# Patient Record
Sex: MALE | Race: WHITE | Employment: OTHER | URBAN - METROPOLITAN AREA
[De-identification: names, ages, dates, MRNs, and addresses within clinical notes are randomized per-mention and may not be internally consistent; named-entity substitution may affect disease eponyms.]

---

## 2024-02-15 LAB
INR PPP: 2.9
PROTHROMBIN TIME: 31.7 SEC (ref 9.3–12.4)

## 2024-02-19 LAB
ALBUMIN SERPL-MCNC: 3.7 G/DL (ref 3.5–5.2)
ALP SERPL-CCNC: 113 U/L (ref 40–129)
ALT SERPL-CCNC: 22 U/L (ref 0–40)
ANION GAP SERPL CALCULATED.3IONS-SCNC: 14 MMOL/L (ref 7–16)
AST SERPL-CCNC: 42 U/L (ref 0–39)
BASOPHILS # BLD: 0.02 K/UL (ref 0–0.2)
BASOPHILS NFR BLD: 0 % (ref 0–2)
BILIRUB SERPL-MCNC: 0.8 MG/DL (ref 0–1.2)
BUN SERPL-MCNC: 55 MG/DL (ref 6–23)
CALCIUM SERPL-MCNC: 8.9 MG/DL (ref 8.6–10.2)
CHLORIDE SERPL-SCNC: 100 MMOL/L (ref 98–107)
CO2 SERPL-SCNC: 29 MMOL/L (ref 22–29)
CREAT SERPL-MCNC: 1.8 MG/DL (ref 0.7–1.2)
EOSINOPHIL # BLD: 0.03 K/UL (ref 0.05–0.5)
EOSINOPHILS RELATIVE PERCENT: 1 % (ref 0–6)
ERYTHROCYTE [DISTWIDTH] IN BLOOD BY AUTOMATED COUNT: 14.2 % (ref 11.5–15)
GFR SERPL CREATININE-BSD FRML MDRD: 36 ML/MIN/1.73M2
GLUCOSE SERPL-MCNC: 116 MG/DL (ref 74–99)
HBA1C MFR BLD: 6.4 % (ref 4–5.6)
HCT VFR BLD AUTO: 36.5 % (ref 37–54)
HGB BLD-MCNC: 11.7 G/DL (ref 12.5–16.5)
IMM GRANULOCYTES # BLD AUTO: <0.03 K/UL (ref 0–0.58)
IMM GRANULOCYTES NFR BLD: 0 % (ref 0–5)
INR PPP: 2.2
LYMPHOCYTES NFR BLD: 0.97 K/UL (ref 1.5–4)
LYMPHOCYTES RELATIVE PERCENT: 16 % (ref 20–42)
MCH RBC QN AUTO: 31 PG (ref 26–35)
MCHC RBC AUTO-ENTMCNC: 32.1 G/DL (ref 32–34.5)
MCV RBC AUTO: 96.8 FL (ref 80–99.9)
MONOCYTES NFR BLD: 0.76 K/UL (ref 0.1–0.95)
MONOCYTES NFR BLD: 13 % (ref 2–12)
NEUTROPHILS NFR BLD: 70 % (ref 43–80)
NEUTS SEG NFR BLD: 4.13 K/UL (ref 1.8–7.3)
PLATELET # BLD AUTO: 151 K/UL (ref 130–450)
PMV BLD AUTO: 11.8 FL (ref 7–12)
POTASSIUM SERPL-SCNC: 4.2 MMOL/L (ref 3.5–5)
PROT SERPL-MCNC: 7.1 G/DL (ref 6.4–8.3)
PROTHROMBIN TIME: 23.6 SEC (ref 9.3–12.4)
RBC # BLD AUTO: 3.77 M/UL (ref 3.8–5.8)
SODIUM SERPL-SCNC: 143 MMOL/L (ref 132–146)
WBC OTHER # BLD: 5.9 K/UL (ref 4.5–11.5)

## 2024-02-26 LAB
HCT VFR BLD AUTO: 34.1 % (ref 37–54)
HGB BLD-MCNC: 10.5 G/DL (ref 12.5–16.5)
INR PPP: 3
PROTHROMBIN TIME: 32.9 SEC (ref 9.3–12.4)

## 2024-02-27 LAB
INR PPP: 3
PROTHROMBIN TIME: 32.9 SEC (ref 9.3–12.4)

## 2024-03-01 LAB
HCT VFR BLD AUTO: 33.3 % (ref 37–54)
HGB BLD-MCNC: 10.4 G/DL (ref 12.5–16.5)
INR PPP: 3.1
PROTHROMBIN TIME: 33.1 SEC (ref 9.3–12.4)

## 2024-03-15 LAB
ANION GAP SERPL CALCULATED.3IONS-SCNC: 15 MMOL/L (ref 7–16)
BUN SERPL-MCNC: 55 MG/DL (ref 6–23)
CALCIUM SERPL-MCNC: 8.8 MG/DL (ref 8.6–10.2)
CHLORIDE SERPL-SCNC: 96 MMOL/L (ref 98–107)
CO2 SERPL-SCNC: 26 MMOL/L (ref 22–29)
CREAT SERPL-MCNC: 2.1 MG/DL (ref 0.7–1.2)
GFR SERPL CREATININE-BSD FRML MDRD: 30 ML/MIN/1.73M2
GLUCOSE SERPL-MCNC: 109 MG/DL (ref 74–99)
POTASSIUM SERPL-SCNC: 4 MMOL/L (ref 3.5–5)
SODIUM SERPL-SCNC: 137 MMOL/L (ref 132–146)

## 2024-03-22 LAB
ANION GAP SERPL CALCULATED.3IONS-SCNC: 11 MMOL/L (ref 7–16)
BUN SERPL-MCNC: 48 MG/DL (ref 6–23)
CALCIUM SERPL-MCNC: 8.7 MG/DL (ref 8.6–10.2)
CHLORIDE SERPL-SCNC: 97 MMOL/L (ref 98–107)
CO2 SERPL-SCNC: 30 MMOL/L (ref 22–29)
CREAT SERPL-MCNC: 1.9 MG/DL (ref 0.7–1.2)
GFR SERPL CREATININE-BSD FRML MDRD: 32 ML/MIN/1.73M2
GLUCOSE SERPL-MCNC: 131 MG/DL (ref 74–99)
POTASSIUM SERPL-SCNC: 3.7 MMOL/L (ref 3.5–5)
SODIUM SERPL-SCNC: 138 MMOL/L (ref 132–146)

## 2024-03-29 LAB
ANION GAP SERPL CALCULATED.3IONS-SCNC: 15 MMOL/L (ref 7–16)
BUN BLDV-MCNC: 35 MG/DL (ref 6–23)
CALCIUM SERPL-MCNC: 9 MG/DL (ref 8.6–10.2)
CHLORIDE BLD-SCNC: 99 MMOL/L (ref 98–107)
CO2: 27 MMOL/L (ref 22–29)
CREAT SERPL-MCNC: 1.9 MG/DL (ref 0.7–1.2)
GFR SERPL CREATININE-BSD FRML MDRD: 34 ML/MIN/1.73M2
GLUCOSE BLD-MCNC: 108 MG/DL (ref 74–99)
POTASSIUM SERPL-SCNC: 4 MMOL/L (ref 3.5–5)
SODIUM BLD-SCNC: 141 MMOL/L (ref 132–146)

## 2024-03-31 ENCOUNTER — APPOINTMENT (OUTPATIENT)
Dept: CT IMAGING | Age: 89
DRG: 312 | End: 2024-03-31
Payer: MEDICARE

## 2024-03-31 ENCOUNTER — HOSPITAL ENCOUNTER (INPATIENT)
Age: 89
LOS: 1 days | Discharge: NURSING, REHAB OR LONG-TERM CARE | DRG: 312 | End: 2024-04-03
Attending: STUDENT IN AN ORGANIZED HEALTH CARE EDUCATION/TRAINING PROGRAM | Admitting: INTERNAL MEDICINE
Payer: MEDICARE

## 2024-03-31 ENCOUNTER — APPOINTMENT (OUTPATIENT)
Dept: GENERAL RADIOLOGY | Age: 89
DRG: 312 | End: 2024-03-31
Payer: MEDICARE

## 2024-03-31 DIAGNOSIS — R57.9 SHOCK (HCC): ICD-10-CM

## 2024-03-31 DIAGNOSIS — I50.9 CHRONIC CONGESTIVE HEART FAILURE, UNSPECIFIED HEART FAILURE TYPE (HCC): ICD-10-CM

## 2024-03-31 DIAGNOSIS — R55 NEAR SYNCOPE: Primary | ICD-10-CM

## 2024-03-31 DIAGNOSIS — E87.20 LACTIC ACIDOSIS: ICD-10-CM

## 2024-03-31 PROBLEM — E11.9 CONTROLLED TYPE 2 DIABETES MELLITUS WITHOUT COMPLICATION (HCC): Status: ACTIVE | Noted: 2024-03-31

## 2024-03-31 PROBLEM — I95.1 ORTHOSTATIC HYPOTENSION: Status: ACTIVE | Noted: 2024-03-31

## 2024-03-31 PROBLEM — I10 PRIMARY HYPERTENSION: Status: ACTIVE | Noted: 2024-03-31

## 2024-03-31 LAB
ALBUMIN SERPL-MCNC: 3.6 G/DL (ref 3.5–5.2)
ALP SERPL-CCNC: 95 U/L (ref 40–129)
ALT SERPL-CCNC: 7 U/L (ref 0–40)
ANION GAP SERPL CALCULATED.3IONS-SCNC: 14 MMOL/L (ref 7–16)
AST SERPL-CCNC: 18 U/L (ref 0–39)
BACTERIA URNS QL MICRO: ABNORMAL
BASOPHILS # BLD: 0.03 K/UL (ref 0–0.2)
BASOPHILS NFR BLD: 0 % (ref 0–2)
BILIRUB SERPL-MCNC: 1.1 MG/DL (ref 0–1.2)
BILIRUB UR QL STRIP: NEGATIVE
BNP SERPL-MCNC: ABNORMAL PG/ML (ref 0–450)
BUN SERPL-MCNC: 42 MG/DL (ref 6–23)
CALCIUM SERPL-MCNC: 9 MG/DL (ref 8.6–10.2)
CHLORIDE SERPL-SCNC: 95 MMOL/L (ref 98–107)
CLARITY UR: CLEAR
CO2 SERPL-SCNC: 26 MMOL/L (ref 22–29)
COLOR UR: YELLOW
CREAT SERPL-MCNC: 2 MG/DL (ref 0.7–1.2)
EOSINOPHIL # BLD: 0.03 K/UL (ref 0.05–0.5)
EOSINOPHILS RELATIVE PERCENT: 0 % (ref 0–6)
ERYTHROCYTE [DISTWIDTH] IN BLOOD BY AUTOMATED COUNT: 15.5 % (ref 11.5–15)
GFR SERPL CREATININE-BSD FRML MDRD: 31 ML/MIN/1.73M2
GLUCOSE BLD-MCNC: 131 MG/DL (ref 74–99)
GLUCOSE BLD-MCNC: 176 MG/DL (ref 74–99)
GLUCOSE SERPL-MCNC: 177 MG/DL (ref 74–99)
GLUCOSE UR STRIP-MCNC: >=1000 MG/DL
HCT VFR BLD AUTO: 39.7 % (ref 37–54)
HGB BLD-MCNC: 12.2 G/DL (ref 12.5–16.5)
HGB UR QL STRIP.AUTO: NEGATIVE
IMM GRANULOCYTES # BLD AUTO: <0.03 K/UL (ref 0–0.58)
IMM GRANULOCYTES NFR BLD: 0 % (ref 0–5)
INFLUENZA A BY PCR: NOT DETECTED
INFLUENZA B BY PCR: NOT DETECTED
KETONES UR STRIP-MCNC: NEGATIVE MG/DL
LACTATE BLDV-SCNC: 1.9 MMOL/L (ref 0.5–2.2)
LACTATE BLDV-SCNC: 3.5 MMOL/L (ref 0.5–2.2)
LEUKOCYTE ESTERASE UR QL STRIP: NEGATIVE
LIPASE SERPL-CCNC: 38 U/L (ref 13–60)
LYMPHOCYTES NFR BLD: 0.76 K/UL (ref 1.5–4)
LYMPHOCYTES RELATIVE PERCENT: 10 % (ref 20–42)
MAGNESIUM SERPL-MCNC: 2.5 MG/DL (ref 1.6–2.6)
MCH RBC QN AUTO: 30.8 PG (ref 26–35)
MCHC RBC AUTO-ENTMCNC: 30.7 G/DL (ref 32–34.5)
MCV RBC AUTO: 100.3 FL (ref 80–99.9)
MONOCYTES NFR BLD: 1.11 K/UL (ref 0.1–0.95)
MONOCYTES NFR BLD: 14 % (ref 2–12)
NEUTROPHILS NFR BLD: 76 % (ref 43–80)
NEUTS SEG NFR BLD: 6.01 K/UL (ref 1.8–7.3)
NITRITE UR QL STRIP: NEGATIVE
PH UR STRIP: 5.5 [PH] (ref 5–9)
PLATELET # BLD AUTO: 141 K/UL (ref 130–450)
PMV BLD AUTO: 10.6 FL (ref 7–12)
POTASSIUM SERPL-SCNC: 3.7 MMOL/L (ref 3.5–5)
PROT SERPL-MCNC: 6.9 G/DL (ref 6.4–8.3)
PROT UR STRIP-MCNC: NEGATIVE MG/DL
RBC # BLD AUTO: 3.96 M/UL (ref 3.8–5.8)
RBC #/AREA URNS HPF: ABNORMAL /HPF
SARS-COV-2 RDRP RESP QL NAA+PROBE: NOT DETECTED
SODIUM SERPL-SCNC: 135 MMOL/L (ref 132–146)
SP GR UR STRIP: 1.01 (ref 1–1.03)
SPECIMEN DESCRIPTION: NORMAL
TROPONIN I SERPL HS-MCNC: 123 NG/L (ref 0–11)
TROPONIN I SERPL HS-MCNC: 139 NG/L (ref 0–11)
UROBILINOGEN UR STRIP-ACNC: 0.2 EU/DL (ref 0–1)
WBC #/AREA URNS HPF: ABNORMAL /HPF
WBC OTHER # BLD: 8 K/UL (ref 4.5–11.5)

## 2024-03-31 PROCEDURE — 85025 COMPLETE CBC W/AUTO DIFF WBC: CPT

## 2024-03-31 PROCEDURE — 96360 HYDRATION IV INFUSION INIT: CPT

## 2024-03-31 PROCEDURE — 70450 CT HEAD/BRAIN W/O DYE: CPT

## 2024-03-31 PROCEDURE — 2580000003 HC RX 258

## 2024-03-31 PROCEDURE — 83690 ASSAY OF LIPASE: CPT

## 2024-03-31 PROCEDURE — 93005 ELECTROCARDIOGRAM TRACING: CPT

## 2024-03-31 PROCEDURE — 71045 X-RAY EXAM CHEST 1 VIEW: CPT

## 2024-03-31 PROCEDURE — 87635 SARS-COV-2 COVID-19 AMP PRB: CPT

## 2024-03-31 PROCEDURE — G0378 HOSPITAL OBSERVATION PER HR: HCPCS

## 2024-03-31 PROCEDURE — 99285 EMERGENCY DEPT VISIT HI MDM: CPT

## 2024-03-31 PROCEDURE — 96361 HYDRATE IV INFUSION ADD-ON: CPT

## 2024-03-31 PROCEDURE — 83036 HEMOGLOBIN GLYCOSYLATED A1C: CPT

## 2024-03-31 PROCEDURE — 99223 1ST HOSP IP/OBS HIGH 75: CPT | Performed by: INTERNAL MEDICINE

## 2024-03-31 PROCEDURE — 82962 GLUCOSE BLOOD TEST: CPT

## 2024-03-31 PROCEDURE — 6370000000 HC RX 637 (ALT 250 FOR IP): Performed by: INTERNAL MEDICINE

## 2024-03-31 PROCEDURE — 84484 ASSAY OF TROPONIN QUANT: CPT

## 2024-03-31 PROCEDURE — 6370000000 HC RX 637 (ALT 250 FOR IP): Performed by: STUDENT IN AN ORGANIZED HEALTH CARE EDUCATION/TRAINING PROGRAM

## 2024-03-31 PROCEDURE — 71275 CT ANGIOGRAPHY CHEST: CPT

## 2024-03-31 PROCEDURE — 83605 ASSAY OF LACTIC ACID: CPT

## 2024-03-31 PROCEDURE — 83880 ASSAY OF NATRIURETIC PEPTIDE: CPT

## 2024-03-31 PROCEDURE — 6360000004 HC RX CONTRAST MEDICATION: Performed by: RADIOLOGY

## 2024-03-31 PROCEDURE — 87502 INFLUENZA DNA AMP PROBE: CPT

## 2024-03-31 PROCEDURE — P9047 ALBUMIN (HUMAN), 25%, 50ML: HCPCS

## 2024-03-31 PROCEDURE — 80053 COMPREHEN METABOLIC PANEL: CPT

## 2024-03-31 PROCEDURE — 2580000003 HC RX 258: Performed by: INTERNAL MEDICINE

## 2024-03-31 PROCEDURE — 81001 URINALYSIS AUTO W/SCOPE: CPT

## 2024-03-31 PROCEDURE — 6360000002 HC RX W HCPCS

## 2024-03-31 PROCEDURE — 83735 ASSAY OF MAGNESIUM: CPT

## 2024-03-31 RX ORDER — FOLIC ACID 1 MG/1
1 TABLET ORAL DAILY
Status: DISCONTINUED | OUTPATIENT
Start: 2024-04-01 | End: 2024-04-03 | Stop reason: HOSPADM

## 2024-03-31 RX ORDER — DOCUSATE SODIUM 100 MG/1
200 CAPSULE, LIQUID FILLED ORAL NIGHTLY
COMMUNITY

## 2024-03-31 RX ORDER — POLYETHYLENE GLYCOL 3350 17 G/17G
17 POWDER, FOR SOLUTION ORAL DAILY PRN
Status: DISCONTINUED | OUTPATIENT
Start: 2024-03-31 | End: 2024-04-03 | Stop reason: HOSPADM

## 2024-03-31 RX ORDER — FAMOTIDINE 20 MG/1
20 TABLET, FILM COATED ORAL DAILY
Status: DISCONTINUED | OUTPATIENT
Start: 2024-04-01 | End: 2024-04-03 | Stop reason: HOSPADM

## 2024-03-31 RX ORDER — MIRTAZAPINE 15 MG/1
15 TABLET, FILM COATED ORAL NIGHTLY
Status: DISCONTINUED | OUTPATIENT
Start: 2024-03-31 | End: 2024-04-03 | Stop reason: HOSPADM

## 2024-03-31 RX ORDER — GLUCAGON 1 MG/ML
1 KIT INJECTION PRN
Status: DISCONTINUED | OUTPATIENT
Start: 2024-03-31 | End: 2024-04-03 | Stop reason: HOSPADM

## 2024-03-31 RX ORDER — SODIUM CHLORIDE 0.9 % (FLUSH) 0.9 %
5-40 SYRINGE (ML) INJECTION PRN
Status: DISCONTINUED | OUTPATIENT
Start: 2024-03-31 | End: 2024-04-03 | Stop reason: HOSPADM

## 2024-03-31 RX ORDER — INSULIN LISPRO 100 [IU]/ML
0-4 INJECTION, SOLUTION INTRAVENOUS; SUBCUTANEOUS NIGHTLY
Status: DISCONTINUED | OUTPATIENT
Start: 2024-03-31 | End: 2024-04-03 | Stop reason: HOSPADM

## 2024-03-31 RX ORDER — PRAVASTATIN SODIUM 40 MG
40 TABLET ORAL NIGHTLY
COMMUNITY

## 2024-03-31 RX ORDER — CARVEDILOL 6.25 MG/1
6.25 TABLET ORAL 2 TIMES DAILY WITH MEALS
Status: DISCONTINUED | OUTPATIENT
Start: 2024-04-01 | End: 2024-04-02

## 2024-03-31 RX ORDER — FAMOTIDINE 20 MG/1
20 TABLET, FILM COATED ORAL DAILY
COMMUNITY

## 2024-03-31 RX ORDER — 0.9 % SODIUM CHLORIDE 0.9 %
500 INTRAVENOUS SOLUTION INTRAVENOUS ONCE
Status: COMPLETED | OUTPATIENT
Start: 2024-03-31 | End: 2024-03-31

## 2024-03-31 RX ORDER — 0.9 % SODIUM CHLORIDE 0.9 %
500 INTRAVENOUS SOLUTION INTRAVENOUS ONCE
Status: DISCONTINUED | OUTPATIENT
Start: 2024-03-31 | End: 2024-03-31

## 2024-03-31 RX ORDER — SODIUM CHLORIDE 9 MG/ML
INJECTION, SOLUTION INTRAVENOUS PRN
Status: DISCONTINUED | OUTPATIENT
Start: 2024-03-31 | End: 2024-04-03 | Stop reason: HOSPADM

## 2024-03-31 RX ORDER — INSULIN LISPRO 100 [IU]/ML
0-4 INJECTION, SOLUTION INTRAVENOUS; SUBCUTANEOUS
Status: DISCONTINUED | OUTPATIENT
Start: 2024-04-01 | End: 2024-04-03 | Stop reason: HOSPADM

## 2024-03-31 RX ORDER — MIDODRINE HYDROCHLORIDE 5 MG/1
10 TABLET ORAL ONCE
Status: COMPLETED | OUTPATIENT
Start: 2024-03-31 | End: 2024-03-31

## 2024-03-31 RX ORDER — VITAMIN B COMPLEX
1000 TABLET ORAL DAILY
Status: DISCONTINUED | OUTPATIENT
Start: 2024-03-31 | End: 2024-04-03 | Stop reason: HOSPADM

## 2024-03-31 RX ORDER — ONDANSETRON 2 MG/ML
4 INJECTION INTRAMUSCULAR; INTRAVENOUS EVERY 6 HOURS PRN
Status: DISCONTINUED | OUTPATIENT
Start: 2024-03-31 | End: 2024-03-31

## 2024-03-31 RX ORDER — ALBUMIN (HUMAN) 12.5 G/50ML
25 SOLUTION INTRAVENOUS ONCE
Status: COMPLETED | OUTPATIENT
Start: 2024-03-31 | End: 2024-03-31

## 2024-03-31 RX ORDER — ISOSORBIDE MONONITRATE 30 MG/1
30 TABLET, EXTENDED RELEASE ORAL DAILY
Status: ON HOLD | COMMUNITY
End: 2024-04-03 | Stop reason: HOSPADM

## 2024-03-31 RX ORDER — ISOSORBIDE MONONITRATE 30 MG/1
30 TABLET, EXTENDED RELEASE ORAL DAILY
Status: DISCONTINUED | OUTPATIENT
Start: 2024-04-01 | End: 2024-04-03 | Stop reason: HOSPADM

## 2024-03-31 RX ORDER — FOLIC ACID 1 MG/1
1 TABLET ORAL DAILY
COMMUNITY

## 2024-03-31 RX ORDER — FUROSEMIDE 20 MG/1
20 TABLET ORAL 2 TIMES DAILY
Status: ON HOLD | COMMUNITY
End: 2024-04-03

## 2024-03-31 RX ORDER — CARVEDILOL 6.25 MG/1
6.25 TABLET ORAL 2 TIMES DAILY WITH MEALS
Status: ON HOLD | COMMUNITY
End: 2024-04-03 | Stop reason: HOSPADM

## 2024-03-31 RX ORDER — SODIUM CHLORIDE 9 MG/ML
INJECTION, SOLUTION INTRAVENOUS CONTINUOUS
Status: ACTIVE | OUTPATIENT
Start: 2024-03-31 | End: 2024-04-02

## 2024-03-31 RX ORDER — DEXTROSE MONOHYDRATE 100 MG/ML
INJECTION, SOLUTION INTRAVENOUS CONTINUOUS PRN
Status: DISCONTINUED | OUTPATIENT
Start: 2024-03-31 | End: 2024-04-03 | Stop reason: HOSPADM

## 2024-03-31 RX ORDER — ACETAMINOPHEN 325 MG/1
650 TABLET ORAL EVERY 6 HOURS PRN
Status: DISCONTINUED | OUTPATIENT
Start: 2024-03-31 | End: 2024-04-03 | Stop reason: HOSPADM

## 2024-03-31 RX ORDER — FERROUS SULFATE 325(65) MG
325 TABLET ORAL
COMMUNITY

## 2024-03-31 RX ORDER — SODIUM CHLORIDE 0.9 % (FLUSH) 0.9 %
5-40 SYRINGE (ML) INJECTION EVERY 12 HOURS SCHEDULED
Status: DISCONTINUED | OUTPATIENT
Start: 2024-03-31 | End: 2024-04-03 | Stop reason: HOSPADM

## 2024-03-31 RX ORDER — PRAVASTATIN SODIUM 20 MG
40 TABLET ORAL NIGHTLY
Status: DISCONTINUED | OUTPATIENT
Start: 2024-03-31 | End: 2024-04-03 | Stop reason: HOSPADM

## 2024-03-31 RX ORDER — PROCHLORPERAZINE EDISYLATE 5 MG/ML
10 INJECTION INTRAMUSCULAR; INTRAVENOUS EVERY 6 HOURS PRN
Status: DISCONTINUED | OUTPATIENT
Start: 2024-03-31 | End: 2024-04-03 | Stop reason: HOSPADM

## 2024-03-31 RX ORDER — PROCHLORPERAZINE MALEATE 10 MG
10 TABLET ORAL EVERY 6 HOURS PRN
Status: DISCONTINUED | OUTPATIENT
Start: 2024-03-31 | End: 2024-04-03 | Stop reason: HOSPADM

## 2024-03-31 RX ORDER — ACETAMINOPHEN 650 MG/1
650 SUPPOSITORY RECTAL EVERY 6 HOURS PRN
Status: DISCONTINUED | OUTPATIENT
Start: 2024-03-31 | End: 2024-04-03 | Stop reason: HOSPADM

## 2024-03-31 RX ORDER — ONDANSETRON 4 MG/1
4 TABLET, ORALLY DISINTEGRATING ORAL EVERY 8 HOURS PRN
Status: DISCONTINUED | OUTPATIENT
Start: 2024-03-31 | End: 2024-03-31

## 2024-03-31 RX ORDER — MIRTAZAPINE 15 MG/1
15 TABLET, FILM COATED ORAL NIGHTLY
COMMUNITY

## 2024-03-31 RX ADMIN — ALBUMIN (HUMAN) 25 G: 0.25 INJECTION, SOLUTION INTRAVENOUS at 18:58

## 2024-03-31 RX ADMIN — SODIUM CHLORIDE 500 ML: 9 INJECTION, SOLUTION INTRAVENOUS at 15:50

## 2024-03-31 RX ADMIN — IOPAMIDOL 75 ML: 755 INJECTION, SOLUTION INTRAVENOUS at 16:44

## 2024-03-31 RX ADMIN — SODIUM CHLORIDE 500 ML: 9 INJECTION, SOLUTION INTRAVENOUS at 15:44

## 2024-03-31 RX ADMIN — MIDODRINE HYDROCHLORIDE 10 MG: 5 TABLET ORAL at 17:36

## 2024-03-31 RX ADMIN — PRAVASTATIN SODIUM 40 MG: 20 TABLET ORAL at 22:13

## 2024-03-31 RX ADMIN — MIRTAZAPINE 15 MG: 15 TABLET, FILM COATED ORAL at 22:13

## 2024-03-31 RX ADMIN — Medication 1000 UNITS: at 22:13

## 2024-03-31 RX ADMIN — SODIUM CHLORIDE: 9 INJECTION, SOLUTION INTRAVENOUS at 22:32

## 2024-03-31 RX ADMIN — APIXABAN 2.5 MG: 2.5 TABLET, FILM COATED ORAL at 22:13

## 2024-03-31 ASSESSMENT — LIFESTYLE VARIABLES
HOW MANY STANDARD DRINKS CONTAINING ALCOHOL DO YOU HAVE ON A TYPICAL DAY: PATIENT DOES NOT DRINK
HOW OFTEN DO YOU HAVE A DRINK CONTAINING ALCOHOL: NEVER

## 2024-03-31 ASSESSMENT — PAIN - FUNCTIONAL ASSESSMENT
PAIN_FUNCTIONAL_ASSESSMENT: NONE - DENIES PAIN
PAIN_FUNCTIONAL_ASSESSMENT: NONE - DENIES PAIN

## 2024-03-31 NOTE — ED PROVIDER NOTES
10 mg (10 mg Oral Given 3/31/24 1736)   albumin human 25% IV solution 25 g (0 g IntraVENous Stopped 3/31/24 1900)     89-year-old male with history of CAD, heart failure, DVT, PE who presents after a near syncopal episode while having dinner.  Family does mention that he was recently on a rehabilitation facility.  He was also put on diuresis for increased amount of fluid retention.  On arrival he was ANO x 4, soft and blood pressure with 87 x 56.  He was gently resuscitated with IV fluids.  Workup reveals no leukocytosis, hemoglobin stable.  He does have CKD with creatinine at his baseline.  proBNP is elevated to 10,000, no baseline on chart review.  Lactate is elevated to 3.5.  EKG showed ventricular paced rhythm, no ST elevation.  Troponin trended 139-123.  No acute findings on the CT head.  Although the patient is on Eliquis, since he was transition from warfarin and the dose of Eliquis being 2.5 mg twice daily, a CTA pulmonary with contrast was done to rule out pulmonary embolism.  No with evidence of pulmonary embolus in the CTA.  After about 1 L of gentle IV rehydration, patient blood pressure did improve.  Lactate normalized after IV rehydration.  Patient was also given 1 dose of midodrine.  Critical care consult was done and it was recommended that patient be started on albumin and was safe to be admitted to the floor.  Family was counseled regarding the findings.  Patient's family did wanted to continue the patient on DNR CCA and continue admission and further management.  Patient will be admitted for further resuscitation and fluid management.    Please see ED course for more details:    ED Course as of 03/31/24 2026   Sun Mar 31, 2024   1541 IMPRESSION:  1. There is no acute intracranial abnormality.  Specifically, there is no  intracranial hemorrhage.  2. Atrophy and periventricular leukomalacia,   [SD]   1551 EKG  Ventricular Paced rhythm  Heart rate 90  QTc 540  No ST elevation [SD]   1635 Troponin

## 2024-03-31 NOTE — ED NOTES
ED to Inpatient Handoff Report    Notified Dylan that electronic handoff available and patient ready for transport to room 620.    Safety Risks: Risk of falls    Patient in Restraints: no    Constant Observer or Patient : no    Telemetry Monitoring Ordered :Yes           Order to transfer to unit without monitor:NO    Last MEWS: 1 Time completed: 1930    Deterioration Index Score:   Predictive Model Details          31 (Caution)  Factor Value    Calculated 3/31/2024 19:39 44% Age 89 years old    Deterioration Index Model 37% Respiratory rate 11     9% Systolic 100     3% BUN abnormal (42 mg/dL)     2% Sodium 135 mmol/L     2% Pulse 90     1% Potassium 3.7 mmol/L     0% Pulse oximetry 97 %     0% WBC count 8.0 k/uL     0% Temperature 97.5 °F (36.4 °C)     0% Hematocrit 39.7 %        Vitals:    03/31/24 1842 03/31/24 1852 03/31/24 1901 03/31/24 1930   BP: 101/72  92/68 100/70   Pulse: 99 (!) 104 83 90   Resp: 14 15 12 11   Temp:    97.5 °F (36.4 °C)   TempSrc:    Oral   SpO2: 99% 97% 96% 97%   Weight:       Height:             Opportunity for questions and clarification was provided.

## 2024-03-31 NOTE — ED NOTES
Radiology Procedure Waiver   Name: Young Watkins  : 1930  MRN: [id number]    Date:  3/31/24    Time: 4:28 PM EDT    Benefits of immediately proceeding with Radiology exam(s) without pre-testing outweigh the risks or are not indicated as specified below and therefore the following is/are being waived:    [] Pregnancy test   [] Patients LMP on-time and regular.   [] Patient had Tubal Ligation or has other Contraception Device.   [] Patient  is Menopausal or Premenarcheal.    [] Patient had Full or Partial Hysterectomy.    [] Protocol for Iodine allergy    [] MRI Questionnaire     [x] BUN/Creatinine   [] Patient age w/no hx of renal dysfunction.   [] Patient on Dialysis.   [] Recent Normal Labs.  Electronically signed by Cem Tripathi MD on 3/31/24 at 4:28 PM EDT     Medical Need

## 2024-04-01 ENCOUNTER — APPOINTMENT (OUTPATIENT)
Dept: ULTRASOUND IMAGING | Age: 89
DRG: 312 | End: 2024-04-01
Payer: MEDICARE

## 2024-04-01 PROBLEM — I25.5 ISCHEMIC CARDIOMYOPATHY: Status: ACTIVE | Noted: 2024-04-01

## 2024-04-01 PROBLEM — Z95.810 CARDIAC RESYNCHRONIZATION THERAPY DEFIBRILLATOR (CRT-D) IN PLACE: Status: ACTIVE | Noted: 2024-04-01

## 2024-04-01 PROBLEM — R55 SYNCOPE AND COLLAPSE: Status: ACTIVE | Noted: 2024-04-01

## 2024-04-01 PROBLEM — I38 VHD (VALVULAR HEART DISEASE): Status: ACTIVE | Noted: 2024-04-01

## 2024-04-01 LAB
ALBUMIN SERPL-MCNC: 3.5 G/DL (ref 3.5–5.2)
ALP SERPL-CCNC: 79 U/L (ref 40–129)
ALT SERPL-CCNC: 5 U/L (ref 0–40)
ANION GAP SERPL CALCULATED.3IONS-SCNC: 8 MMOL/L (ref 7–16)
AST SERPL-CCNC: 15 U/L (ref 0–39)
BASOPHILS # BLD: 0.04 K/UL (ref 0–0.2)
BASOPHILS NFR BLD: 1 % (ref 0–2)
BILIRUB SERPL-MCNC: 0.8 MG/DL (ref 0–1.2)
BUN SERPL-MCNC: 38 MG/DL (ref 6–23)
CALCIUM SERPL-MCNC: 8.7 MG/DL (ref 8.6–10.2)
CHLORIDE SERPL-SCNC: 104 MMOL/L (ref 98–107)
CO2 SERPL-SCNC: 29 MMOL/L (ref 22–29)
CREAT SERPL-MCNC: 1.7 MG/DL (ref 0.7–1.2)
CREAT UR-MCNC: 41.2 MG/DL (ref 40–278)
EKG ATRIAL RATE: 79 BPM
EKG Q-T INTERVAL: 442 MS
EKG QRS DURATION: 172 MS
EKG QTC CALCULATION (BAZETT): 540 MS
EKG R AXIS: -58 DEGREES
EKG T AXIS: 9 DEGREES
EKG VENTRICULAR RATE: 90 BPM
EOSINOPHIL # BLD: 0.04 K/UL (ref 0.05–0.5)
EOSINOPHILS RELATIVE PERCENT: 1 % (ref 0–6)
ERYTHROCYTE [DISTWIDTH] IN BLOOD BY AUTOMATED COUNT: 15.7 % (ref 11.5–15)
FOLATE SERPL-MCNC: >20 NG/ML (ref 4.8–24.2)
GFR SERPL CREATININE-BSD FRML MDRD: 38 ML/MIN/1.73M2
GLUCOSE BLD-MCNC: 103 MG/DL (ref 74–99)
GLUCOSE BLD-MCNC: 116 MG/DL (ref 74–99)
GLUCOSE BLD-MCNC: 153 MG/DL (ref 74–99)
GLUCOSE BLD-MCNC: 166 MG/DL (ref 74–99)
GLUCOSE BLD-MCNC: 72 MG/DL (ref 74–99)
GLUCOSE SERPL-MCNC: 101 MG/DL (ref 74–99)
HBA1C MFR BLD: 6.2 % (ref 4–5.6)
HCT VFR BLD AUTO: 35.8 % (ref 37–54)
HGB BLD-MCNC: 11.1 G/DL (ref 12.5–16.5)
IMM GRANULOCYTES # BLD AUTO: <0.03 K/UL (ref 0–0.58)
IMM GRANULOCYTES NFR BLD: 0 % (ref 0–5)
LACTATE BLDV-SCNC: 1.2 MMOL/L (ref 0.5–2.2)
LYMPHOCYTES NFR BLD: 0.92 K/UL (ref 1.5–4)
LYMPHOCYTES RELATIVE PERCENT: 16 % (ref 20–42)
MAGNESIUM SERPL-MCNC: 2.5 MG/DL (ref 1.6–2.6)
MCH RBC QN AUTO: 30.4 PG (ref 26–35)
MCHC RBC AUTO-ENTMCNC: 31 G/DL (ref 32–34.5)
MCV RBC AUTO: 98.1 FL (ref 80–99.9)
MONOCYTES NFR BLD: 0.88 K/UL (ref 0.1–0.95)
MONOCYTES NFR BLD: 16 % (ref 2–12)
NEUTROPHILS NFR BLD: 67 % (ref 43–80)
NEUTS SEG NFR BLD: 3.78 K/UL (ref 1.8–7.3)
OSMOLALITY UR: 412 MOSM/KG (ref 300–900)
PHOSPHATE SERPL-MCNC: 3.9 MG/DL (ref 2.5–4.5)
PLATELET # BLD AUTO: 121 K/UL (ref 130–450)
PMV BLD AUTO: 10.7 FL (ref 7–12)
POTASSIUM SERPL-SCNC: 3.7 MMOL/L (ref 3.5–5)
PROT SERPL-MCNC: 6.4 G/DL (ref 6.4–8.3)
RBC # BLD AUTO: 3.65 M/UL (ref 3.8–5.8)
SODIUM SERPL-SCNC: 141 MMOL/L (ref 132–146)
SODIUM UR-SCNC: 45 MMOL/L
TOTAL PROTEIN, URINE: 7 MG/DL (ref 0–12)
URINE TOTAL PROTEIN CREATININE RATIO: 0.17 (ref 0–0.2)
UUN UR-MCNC: 460 MG/DL (ref 800–1666)
VIT B12 SERPL-MCNC: 804 PG/ML (ref 211–946)
WBC OTHER # BLD: 5.7 K/UL (ref 4.5–11.5)

## 2024-04-01 PROCEDURE — G0378 HOSPITAL OBSERVATION PER HR: HCPCS

## 2024-04-01 PROCEDURE — 93283 PRGRMG EVAL IMPLANTABLE DFB: CPT | Performed by: INTERNAL MEDICINE

## 2024-04-01 PROCEDURE — 6370000000 HC RX 637 (ALT 250 FOR IP): Performed by: INTERNAL MEDICINE

## 2024-04-01 PROCEDURE — 97161 PT EVAL LOW COMPLEX 20 MIN: CPT

## 2024-04-01 PROCEDURE — 82746 ASSAY OF FOLIC ACID SERUM: CPT

## 2024-04-01 PROCEDURE — 97165 OT EVAL LOW COMPLEX 30 MIN: CPT

## 2024-04-01 PROCEDURE — 85025 COMPLETE CBC W/AUTO DIFF WBC: CPT

## 2024-04-01 PROCEDURE — 99223 1ST HOSP IP/OBS HIGH 75: CPT | Performed by: INTERNAL MEDICINE

## 2024-04-01 PROCEDURE — 84156 ASSAY OF PROTEIN URINE: CPT

## 2024-04-01 PROCEDURE — 83735 ASSAY OF MAGNESIUM: CPT

## 2024-04-01 PROCEDURE — 83935 ASSAY OF URINE OSMOLALITY: CPT

## 2024-04-01 PROCEDURE — 82607 VITAMIN B-12: CPT

## 2024-04-01 PROCEDURE — 82962 GLUCOSE BLOOD TEST: CPT

## 2024-04-01 PROCEDURE — 97530 THERAPEUTIC ACTIVITIES: CPT

## 2024-04-01 PROCEDURE — 93010 ELECTROCARDIOGRAM REPORT: CPT | Performed by: INTERNAL MEDICINE

## 2024-04-01 PROCEDURE — 99232 SBSQ HOSP IP/OBS MODERATE 35: CPT | Performed by: INTERNAL MEDICINE

## 2024-04-01 PROCEDURE — 83605 ASSAY OF LACTIC ACID: CPT

## 2024-04-01 PROCEDURE — 36415 COLL VENOUS BLD VENIPUNCTURE: CPT

## 2024-04-01 PROCEDURE — 80053 COMPREHEN METABOLIC PANEL: CPT

## 2024-04-01 PROCEDURE — 84540 ASSAY OF URINE/UREA-N: CPT

## 2024-04-01 PROCEDURE — 82570 ASSAY OF URINE CREATININE: CPT

## 2024-04-01 PROCEDURE — 84100 ASSAY OF PHOSPHORUS: CPT

## 2024-04-01 PROCEDURE — 76770 US EXAM ABDO BACK WALL COMP: CPT

## 2024-04-01 PROCEDURE — 84300 ASSAY OF URINE SODIUM: CPT

## 2024-04-01 RX ADMIN — FOLIC ACID 1 MG: 1 TABLET ORAL at 08:26

## 2024-04-01 RX ADMIN — ISOSORBIDE MONONITRATE 30 MG: 30 TABLET, EXTENDED RELEASE ORAL at 08:26

## 2024-04-01 RX ADMIN — CARVEDILOL 6.25 MG: 6.25 TABLET, FILM COATED ORAL at 17:02

## 2024-04-01 RX ADMIN — APIXABAN 2.5 MG: 2.5 TABLET, FILM COATED ORAL at 20:37

## 2024-04-01 RX ADMIN — MIRTAZAPINE 15 MG: 15 TABLET, FILM COATED ORAL at 20:37

## 2024-04-01 RX ADMIN — APIXABAN 2.5 MG: 2.5 TABLET, FILM COATED ORAL at 08:26

## 2024-04-01 RX ADMIN — FAMOTIDINE 20 MG: 20 TABLET ORAL at 08:26

## 2024-04-01 RX ADMIN — Medication 1000 UNITS: at 20:37

## 2024-04-01 RX ADMIN — CARVEDILOL 6.25 MG: 6.25 TABLET, FILM COATED ORAL at 08:26

## 2024-04-01 RX ADMIN — PRAVASTATIN SODIUM 40 MG: 20 TABLET ORAL at 23:55

## 2024-04-01 NOTE — H&P
[facility] Hospitalist [facility]   History and Physical      CHIEF COMPLAINT:  near syncope    History of Present Illness: pt is a 89 y.o. male who presents after near syncopal episode. Pt has had recent diuretic changes with last one a reduction in dose. Pt has had very edematous legs and pt and son report that legs look much better with much of the edema removed. Pt was at lunch today sitting on a barstool. Pt had c/o some shoulder pain and was getting a back rub while sitting on chair. Pt started to lay head on table. Pt was mumbling and EMS called. While waiting for ambulance, pt had one bout of n/v. Pt has hx of sarah and does not use cpap anymore cause needs a new one but states he can't sleep at night. Pt denies fevers, chills, cp, sob, abd pain, diarrhea, constipation, melena,hematochezia, change in urination(pt goes a lot), dysuria or hematuria.     REVIEW OF SYSTEMS:    A detailed system review was conducted with patient and is negative unless stated in hpi.        PMH:  No past medical history on file.  Ckd  cad  heart failure  atrial fib sarah htn hyperlipidemia gerd pe dvt  Surgical History:  No past surgical history on file.  2 knee surgeries gallbladder removal ICD placement cataract removal  Medications Prior to Admission:    Prior to Admission medications    Medication Sig Start Date End Date Taking? Authorizing Provider   carvedilol (COREG) 6.25 MG tablet Take 1 tablet by mouth 2 times daily (with meals)   Yes Dre James MD   furosemide (LASIX) 20 MG tablet Take 1 tablet by mouth 2 times daily 3 tablets in the morning and 2 tablets in the evening   Yes Dre James MD   empagliflozin (JARDIANCE) 10 MG tablet Take 1 tablet by mouth daily   Yes Dre James MD   vitamin D 25 MCG (1000 UT) CAPS Take 1 capsule by mouth daily   Yes Dre James MD   cyanocobalamin 1000 MCG tablet Take 0.5 tablets by mouth daily   Yes Dre Jamse MD   famotidine

## 2024-04-01 NOTE — CONSULTS
Nephrology Consult  [facility]  Danis Maier. MD    CC:   arf    HPI:   the pt is a 89 yo male who has a pmh of chf, htn, dm, hyperlipidemia who came after having a syncopal episode. He has recently been given diuretics for le edema. He was on lasix 40 bid but was increased last tues from this to 60/40. Edema did improve. Labs show na 141 k 3.7 co2 29 bun 38 cr 2>1.7, ng 2.5 lactate 1.2 ca 8.7, p 3.9, alb 3.5, wbc 5.7, hgb 11.1, plt 121, ua >1000 glucose, tr bacteria. Cta showed no PE. Bobby is pending. Vitals showed temp 97.6, hr 97 rr 18 bp 96/62. Bp in er was 79/60. Iv albumin was given and a liter of ns. He is now on ns at 50. His outpt lasix 60 in am and 40 in pm is on hold. Outpt jardiance is also on hold. He is followed by dr somers in the office and baseline cr is 1.8 from 2/19/24.   Pt is a poor historian, family is present and is giving history. He lives alone and had health aides. He has been admitted several times this yr. Family now awaiting placement for him if he agrees. He doesn't follow a low salt diet and doesn't wear compression hose.    PMH:    No past medical history on file.    Patient Active Problem List   Diagnosis    Acute heart failure, unspecified heart failure type (HCC)    Orthostatic hypotension    Controlled type 2 diabetes mellitus without complication (HCC)    Primary hypertension    Chronic congestive heart failure (HCC)       Meds:     apixaban  2.5 mg Oral BID    carvedilol  6.25 mg Oral BID WC    famotidine  20 mg Oral Daily    folic acid  1 mg Oral Daily    isosorbide mononitrate  30 mg Oral Daily    mirtazapine  15 mg Oral Nightly    pravastatin  40 mg Oral Nightly    Vitamin D  1,000 Units Oral Daily    sodium chloride flush  5-40 mL IntraVENous 2 times per day    insulin lispro  0-4 Units SubCUTAneous TID WC    insulin lispro  0-4 Units SubCUTAneous Nightly        sodium chloride      dextrose      sodium chloride 50 mL/hr at 03/31/24 2232       Meds prn:     perflutren

## 2024-04-01 NOTE — CONSULTS
Critical Care consultation cancelled.  Please let us know if we can be of any further assistance.    Ever Hernandez MD  4/1/2024  11:07 AM

## 2024-04-01 NOTE — CONSULTS
INPATIENT CARDIOLOGY CONSULT     Reason for Consult: Syncope / Hx of CMP    Cardiologist: Dr. Ocasio    Requesting Physician: Dr. Vaughan    Date of Consultation: 4/1/2024    HISTORY OF PRESENT ILLNESS:   Mr. Watkins is a 89 year old male who is known to Dr. Atkins (Cardiology at [facility]). Patient was last seen in OP on 2/29/2024. (Records have been requested; unavailable for review at this time).    Please note that the majority of the information was obtained from patient's daughter who is currently at the bedside due to the patient is hard of hearing and has mild cognitive impairment.  Patient was last seen by his primary cardiologist at [facility] on 2/29/2024.  Prior to that outpatient visit, patient was admitted with right lower extremity cellulitis, pain in his right foot and was found to be anemic.  Cardiology was consulted at that time (details of admission are unclear).    Patient lives alone but went to his daughter's house for [email or web address].  While he was sitting at the counter he suddenly developed posterior neck pain.  His daughter stated that he put his head down and a family member was rubbing his neck.  When the family member was trying to talk to the patient he was unresponsive.  After 2 to 3 seconds he became awake and appeared pale and diaphoretic.  He was confused for approximately 5 minutes after the event.  He did however have multiple episodes of vomiting afterward.  It is unclear if he hit his head at that time.  He had no loss of bowel or bladder at that time.  Patient denied chest pain, shortness of breath, palpitations or feeling of his heart racing.  Due to worsening symptoms he presented to [facility] on 3/31/2024.     Upon arrival to the ED: Blood pressure 87/56, heart rate 87, afebrile, 97% on room air. Labs: Sodium 135, potassium 3.7, BUN 42, creatinine 2.0>>1.7 (today), lactic acid 3.5, proBNP 10,106.  High-sensitivity troponin 139, 123, W BC 8.0,

## 2024-04-02 ENCOUNTER — APPOINTMENT (OUTPATIENT)
Age: 89
DRG: 312 | End: 2024-04-02
Payer: MEDICARE

## 2024-04-02 PROBLEM — I50.9 ACUTE ON CHRONIC HEART FAILURE, UNSPECIFIED HEART FAILURE TYPE (HCC): Status: ACTIVE | Noted: 2024-04-02

## 2024-04-02 LAB
ALBUMIN SERPL-MCNC: 3.2 G/DL (ref 3.5–5.2)
ALP SERPL-CCNC: 77 U/L (ref 40–129)
ALT SERPL-CCNC: <5 U/L (ref 0–40)
ANION GAP SERPL CALCULATED.3IONS-SCNC: 5 MMOL/L (ref 7–16)
AST SERPL-CCNC: 14 U/L (ref 0–39)
BASOPHILS # BLD: 0.03 K/UL (ref 0–0.2)
BASOPHILS NFR BLD: 1 % (ref 0–2)
BILIRUB SERPL-MCNC: 0.6 MG/DL (ref 0–1.2)
BUN SERPL-MCNC: 36 MG/DL (ref 6–23)
CALCIUM SERPL-MCNC: 8.6 MG/DL (ref 8.6–10.2)
CHLORIDE SERPL-SCNC: 103 MMOL/L (ref 98–107)
CO2 SERPL-SCNC: 28 MMOL/L (ref 22–29)
CREAT SERPL-MCNC: 1.7 MG/DL (ref 0.7–1.2)
ECHO AO ASC DIAM: 3.7 CM
ECHO AO ASCENDING AORTA INDEX: 1.85 CM/M2
ECHO AV AREA PEAK VELOCITY: 1 CM2
ECHO AV AREA VTI: 1 CM2
ECHO AV AREA/BSA PEAK VELOCITY: 0.5 CM2/M2
ECHO AV AREA/BSA VTI: 0.5 CM2/M2
ECHO AV MEAN GRADIENT: 13 MMHG
ECHO AV MEAN VELOCITY: 1.7 M/S
ECHO AV PEAK GRADIENT: 22 MMHG
ECHO AV PEAK VELOCITY: 2.4 M/S
ECHO AV VELOCITY RATIO: 0.25
ECHO AV VTI: 37.3 CM
ECHO BSA: 2.05 M2
ECHO EST RA PRESSURE: 15 MMHG
ECHO LA DIAMETER INDEX: 2.45 CM/M2
ECHO LA DIAMETER: 4.9 CM
ECHO LA VOL A-L A2C: 144 ML (ref 18–58)
ECHO LA VOL A-L A4C: 215 ML (ref 18–58)
ECHO LA VOL MOD A2C: 140 ML (ref 18–58)
ECHO LA VOL MOD A4C: 200 ML (ref 18–58)
ECHO LA VOLUME AREA LENGTH: 183 ML
ECHO LA VOLUME INDEX A-L A2C: 72 ML/M2 (ref 16–34)
ECHO LA VOLUME INDEX A-L A4C: 108 ML/M2 (ref 16–34)
ECHO LA VOLUME INDEX AREA LENGTH: 92 ML/M2 (ref 16–34)
ECHO LA VOLUME INDEX MOD A2C: 70 ML/M2 (ref 16–34)
ECHO LA VOLUME INDEX MOD A4C: 100 ML/M2 (ref 16–34)
ECHO LV EDV A2C: 87 ML
ECHO LV EDV A4C: 96 ML
ECHO LV EDV BP: 92 ML (ref 67–155)
ECHO LV EDV INDEX A4C: 48 ML/M2
ECHO LV EDV INDEX BP: 46 ML/M2
ECHO LV EDV NDEX A2C: 44 ML/M2
ECHO LV EJECTION FRACTION A2C: 41 %
ECHO LV EJECTION FRACTION A4C: 40 %
ECHO LV EJECTION FRACTION BIPLANE: 39 % (ref 55–100)
ECHO LV ESV A2C: 51 ML
ECHO LV ESV A4C: 58 ML
ECHO LV ESV BP: 56 ML (ref 22–58)
ECHO LV ESV INDEX A2C: 26 ML/M2
ECHO LV ESV INDEX A4C: 29 ML/M2
ECHO LV ESV INDEX BP: 28 ML/M2
ECHO LV FRACTIONAL SHORTENING: 8 % (ref 28–44)
ECHO LV INTERNAL DIMENSION DIASTOLE INDEX: 2.6 CM/M2
ECHO LV INTERNAL DIMENSION DIASTOLIC: 5.2 CM (ref 4.2–5.9)
ECHO LV INTERNAL DIMENSION SYSTOLIC INDEX: 2.4 CM/M2
ECHO LV INTERNAL DIMENSION SYSTOLIC: 4.8 CM
ECHO LV IVSD: 1 CM (ref 0.6–1)
ECHO LV IVSS: 1.1 CM
ECHO LV MASS 2D: 234.6 G (ref 88–224)
ECHO LV MASS INDEX 2D: 117.3 G/M2 (ref 49–115)
ECHO LV POSTERIOR WALL DIASTOLIC: 1.3 CM (ref 0.6–1)
ECHO LV POSTERIOR WALL SYSTOLIC: 1.8 CM
ECHO LV RELATIVE WALL THICKNESS RATIO: 0.5
ECHO LVOT AREA: 3.5 CM2
ECHO LVOT AV VTI INDEX: 0.27
ECHO LVOT DIAM: 2.1 CM
ECHO LVOT MEAN GRADIENT: 1 MMHG
ECHO LVOT PEAK GRADIENT: 2 MMHG
ECHO LVOT PEAK VELOCITY: 0.6 M/S
ECHO LVOT STROKE VOLUME INDEX: 17.7 ML/M2
ECHO LVOT SV: 35.3 ML
ECHO LVOT VTI: 10.2 CM
ECHO MV AREA PHT: 4.4 CM2
ECHO MV AREA VTI: 1.3 CM2
ECHO MV EROA PISA: 0.3 CM2
ECHO MV LVOT VTI INDEX: 2.73
ECHO MV MAX VELOCITY: 1.4 M/S
ECHO MV MEAN GRADIENT: 3 MMHG
ECHO MV MEAN VELOCITY: 0.7 M/S
ECHO MV PEAK GRADIENT: 8 MMHG
ECHO MV PRESSURE HALF TIME (PHT): 49.8 MS
ECHO MV REGURGITANT ALIASING (NYQUIST) VELOCITY: 37 CM/S
ECHO MV REGURGITANT RADIUS PISA: 0.72 CM
ECHO MV REGURGITANT VELOCITY PISA: 4.1 M/S
ECHO MV REGURGITANT VOLUME PISA: 31.99 ML
ECHO MV REGURGITANT VTIA: 108.9 CM
ECHO MV VTI: 27.8 CM
ECHO PULMONARY ARTERY SYSTOLIC PRESSURE (PASP): 45 MMHG
ECHO RIGHT VENTRICULAR SYSTOLIC PRESSURE (RVSP): 45 MMHG
ECHO TV REGURGITANT MAX VELOCITY: 2.75 M/S
ECHO TV REGURGITANT PEAK GRADIENT: 30 MMHG
EOSINOPHIL # BLD: 0.03 K/UL (ref 0.05–0.5)
EOSINOPHILS RELATIVE PERCENT: 1 % (ref 0–6)
ERYTHROCYTE [DISTWIDTH] IN BLOOD BY AUTOMATED COUNT: 15.6 % (ref 11.5–15)
GFR SERPL CREATININE-BSD FRML MDRD: 37 ML/MIN/1.73M2
GLUCOSE BLD-MCNC: 138 MG/DL (ref 74–99)
GLUCOSE BLD-MCNC: 138 MG/DL (ref 74–99)
GLUCOSE BLD-MCNC: 152 MG/DL (ref 74–99)
GLUCOSE BLD-MCNC: 208 MG/DL (ref 74–99)
GLUCOSE SERPL-MCNC: 130 MG/DL (ref 74–99)
HCT VFR BLD AUTO: 33.6 % (ref 37–54)
HGB BLD-MCNC: 10.2 G/DL (ref 12.5–16.5)
IMM GRANULOCYTES # BLD AUTO: <0.03 K/UL (ref 0–0.58)
IMM GRANULOCYTES NFR BLD: 0 % (ref 0–5)
LYMPHOCYTES NFR BLD: 0.89 K/UL (ref 1.5–4)
LYMPHOCYTES RELATIVE PERCENT: 14 % (ref 20–42)
MCH RBC QN AUTO: 30.5 PG (ref 26–35)
MCHC RBC AUTO-ENTMCNC: 30.4 G/DL (ref 32–34.5)
MCV RBC AUTO: 100.6 FL (ref 80–99.9)
MONOCYTES NFR BLD: 0.97 K/UL (ref 0.1–0.95)
MONOCYTES NFR BLD: 15 % (ref 2–12)
NEUTROPHILS NFR BLD: 70 % (ref 43–80)
NEUTS SEG NFR BLD: 4.56 K/UL (ref 1.8–7.3)
PLATELET, FLUORESCENCE: 139 K/UL (ref 130–450)
PMV BLD AUTO: 11 FL (ref 7–12)
POTASSIUM SERPL-SCNC: 3.6 MMOL/L (ref 3.5–5)
PROT SERPL-MCNC: 6.1 G/DL (ref 6.4–8.3)
RBC # BLD AUTO: 3.34 M/UL (ref 3.8–5.8)
SODIUM SERPL-SCNC: 136 MMOL/L (ref 132–146)
WBC OTHER # BLD: 6.5 K/UL (ref 4.5–11.5)

## 2024-04-02 PROCEDURE — 93306 TTE W/DOPPLER COMPLETE: CPT

## 2024-04-02 PROCEDURE — G0378 HOSPITAL OBSERVATION PER HR: HCPCS

## 2024-04-02 PROCEDURE — 99232 SBSQ HOSP IP/OBS MODERATE 35: CPT | Performed by: INTERNAL MEDICINE

## 2024-04-02 PROCEDURE — 93306 TTE W/DOPPLER COMPLETE: CPT | Performed by: INTERNAL MEDICINE

## 2024-04-02 PROCEDURE — 2580000003 HC RX 258: Performed by: INTERNAL MEDICINE

## 2024-04-02 PROCEDURE — 97535 SELF CARE MNGMENT TRAINING: CPT

## 2024-04-02 PROCEDURE — 85025 COMPLETE CBC W/AUTO DIFF WBC: CPT

## 2024-04-02 PROCEDURE — 80053 COMPREHEN METABOLIC PANEL: CPT

## 2024-04-02 PROCEDURE — 2060000000 HC ICU INTERMEDIATE R&B

## 2024-04-02 PROCEDURE — 82962 GLUCOSE BLOOD TEST: CPT

## 2024-04-02 PROCEDURE — 6370000000 HC RX 637 (ALT 250 FOR IP): Performed by: INTERNAL MEDICINE

## 2024-04-02 RX ADMIN — CARVEDILOL 6.25 MG: 6.25 TABLET, FILM COATED ORAL at 08:02

## 2024-04-02 RX ADMIN — METOPROLOL TARTRATE 12.5 MG: 25 TABLET, FILM COATED ORAL at 20:33

## 2024-04-02 RX ADMIN — PRAVASTATIN SODIUM 40 MG: 20 TABLET ORAL at 20:33

## 2024-04-02 RX ADMIN — APIXABAN 2.5 MG: 2.5 TABLET, FILM COATED ORAL at 09:00

## 2024-04-02 RX ADMIN — ISOSORBIDE MONONITRATE 30 MG: 30 TABLET, EXTENDED RELEASE ORAL at 09:00

## 2024-04-02 RX ADMIN — MIRTAZAPINE 15 MG: 15 TABLET, FILM COATED ORAL at 20:33

## 2024-04-02 RX ADMIN — SODIUM CHLORIDE: 9 INJECTION, SOLUTION INTRAVENOUS at 14:43

## 2024-04-02 RX ADMIN — Medication 1000 UNITS: at 20:33

## 2024-04-02 RX ADMIN — FOLIC ACID 1 MG: 1 TABLET ORAL at 08:59

## 2024-04-02 RX ADMIN — ACETAMINOPHEN 650 MG: 325 TABLET ORAL at 10:57

## 2024-04-02 RX ADMIN — APIXABAN 2.5 MG: 2.5 TABLET, FILM COATED ORAL at 20:33

## 2024-04-02 RX ADMIN — FAMOTIDINE 20 MG: 20 TABLET ORAL at 09:00

## 2024-04-02 ASSESSMENT — PAIN DESCRIPTION - DESCRIPTORS: DESCRIPTORS: ACHING

## 2024-04-02 ASSESSMENT — PAIN DESCRIPTION - LOCATION: LOCATION: NECK

## 2024-04-02 ASSESSMENT — PAIN SCALES - GENERAL: PAINLEVEL_OUTOF10: 6

## 2024-04-02 NOTE — DISCHARGE INSTR - COC
Continuity of Care Form    Patient Name: Young Watkins   :  1930  MRN:  [id number]    Admit date:  3/31/2024  Discharge date:  4/3/2024    Code Status Order: DNR-CCA   Advance Directives:     Admitting Physician:  Pawan Vaughan DO  PCP: Shon Lynch MD    Discharging Nurse: Kathi Lane  Discharging Hospital Unit/Room#: 0620/0620-A  Discharging Unit Phone Number: [phone]    Emergency Contact:   Extended Emergency Contact Information  Primary Emergency Contact: Ozzy Watkins  Mobile Phone: [phone]  Relation: Child  Preferred language: English   needed? No  Secondary Emergency Contact: Radha Cavazos  Mobile Phone: [phone]  Relation: Child  Preferred language: English   needed? No    Past Surgical History:  No past surgical history on file.    Immunization History:   Immunization History   Administered Date(s) Administered    COVID-19, PFIZER GRAY top, DO NOT Dilute, (age 12 y+), IM, 30 mcg/0.3 mL 2022    COVID-19, PFIZER PURPLE top, DILUTE for use, (age 12 y+), 30mcg/0.3mL 2021, 2021, 2021       Active Problems:  Patient Active Problem List   Diagnosis Code    Acute heart failure, unspecified heart failure type (HCC) I50.9    Orthostatic hypotension I95.1    Controlled type 2 diabetes mellitus without complication (HCC) E11.9    Primary hypertension I10    Chronic congestive heart failure (HCC) I50.9    Syncope and collapse R55    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

## 2024-04-02 NOTE — PLAN OF CARE
Problem: Discharge Planning  Goal: Discharge to home or other facility with appropriate resources  4/2/2024 1023 by Kathi Lane RN  Outcome: Progressing  4/2/2024 0040 by Sania Diaz RN  Outcome: Progressing  Flowsheets (Taken 4/1/2024 2030)  Discharge to home or other facility with appropriate resources:   Identify barriers to discharge with patient and caregiver   Arrange for needed discharge resources and transportation as appropriate     Problem: Safety - Adult  Goal: Free from fall injury  4/2/2024 1023 by Kathi Lane RN  Outcome: Progressing  4/2/2024 0040 by Sania Diaz RN  Outcome: Progressing     Problem: ABCDS Injury Assessment  Goal: Absence of physical injury  4/2/2024 1023 by Kathi Lane RN  Outcome: Progressing  4/2/2024 0040 by Sania Diaz RN  Outcome: Progressing     Problem: Chronic Conditions and Co-morbidities  Goal: Patient's chronic conditions and co-morbidity symptoms are monitored and maintained or improved  4/2/2024 1023 by Kathi Lane RN  Outcome: Progressing  4/2/2024 0040 by Sania Diaz RN  Outcome: Progressing

## 2024-04-02 NOTE — DISCHARGE INSTRUCTIONS
***HEART FAILURE - CONGESTIVE HEART FAILURE***  DISCHARGE INSTRUCTIONS:  GUIDELINES TO FOLLOW AT [facility]/SNF/ Assisted Living    No future appointments.       MEDICATIONS:  Please notify the doctor if patient is not able to take their medications or if medications are being held for any reasons (such as low blood pressure ect.)  Do not give the patient ibuprofen (Advil or Motrin), naproxen (Aleve) without talking to the doctor first. This could make their heart failure worse.         WEIGHT MONITORING:   Weigh patient every day in the morning after they void (If patient is able to stand, please get a standing weight.)   Notify the doctor of a weight gain of 3 pounds or more in 1 day   OR  a total of 5 pounds or more in 1 week             DIET   Cardiac heart healthy diet:  Low sodium diet: no  more than 2,000mg (2 grams) of salt / sodium per day (which equals to a little less than  a teaspoon of salt)/ Cardiac Diet: Low saturated / low trans fat, no added salt, caffeine restricted    If patient is there for rehab and will be returning home in the near future; reinforce with the patient and the family to follow a low sodium diet (2,000 mg)- avoid using salt at the table, avoid / limit use of canned soups, processed / packaged foods, salted snacks, olives and pickles.  Do not use a salt substitute without checking with the doctor. (Mrs. Mayfield is safe to use).       NOTIFY THE DOCTOR THE FIRST DAY OF ONSET OF ANY OF THESE   SYMPTOMS:   Weight gain of 3 pounds or more in 1 day         OR 5 pounds or more in one week  More shortness of breath  More swelling in stomach, legs, ankles or feet  Feeling more tired, No energy  Dry hacky cough  Dizziness  More chest pain / discomfort  Hard time breathing laying down

## 2024-04-02 NOTE — CONSULTS
[facility]   Inpatient CHF Nurse Navigator Consult      Cardiologist: Dr Atkins ([facility])    Young Watkins is a 89 y.o. (12/4/1930) male with a history of  CHF ECHO Pending , most recent EF:  No results found for: \"LVEF\", \"LVEFMODE\"    Patient was awake and alert, laying in bed during the consultation and is agreeable to heart failure education. He was engaged and asked appropriate questions throughout the education session. He is going to [facility] after discharge.     Barriers identified during consult contributing to HF Hospitalization:  [] Limited medication adherence   [] Poor health literacy, education regarding HF medications provided   [] Pill box provided to patient  [] Difficulty affording medications  [] Difficulty obtaining/ managing medications  [] Prescription assistance information given     [] Not weighing themselves daily  [x] Weight log provided for easy monitoring  [] Scale provided     [] Not following low sodium diet  [] Food insecurity   [x] 2 gram sodium diet education provided   [] Low sodium recipes provided  [] Sodium free seasoning provided   [] Low sodium meal delivery options given to patient  [] Dietician consulted     [] Lack of transportation to appointments     [] Depression, given chronic illness  [] Primary team notified     [] Goals of care need addressed  [] Palliative care consulted     [] CHF CHW consulted, to assist with     Chart Reviewed:  Diet: ADULT DIET; Regular; 5 carb choices (75 gm/meal)   Daily Weights: Patient Vitals for the past 96 hrs (Last 3 readings):   Weight   04/02/24 0100 84.1 kg (185 lb 4.8 oz)   03/31/24 1436 86.2 kg (190 lb)     I/O:   Intake/Output Summary (Last 24 hours) at 4/2/2024 1156  Last data filed at 4/1/2024 1943  Gross per 24 hour   Intake --   Output 700 ml   Net -700 ml       [] Nursing staff/manager notified of inaccurate britton weights or I/O        Discharge Plan:  Above identified barriers

## 2024-04-02 NOTE — CARE COORDINATION
Social work / Discharge planning:           Patient accepted by [facility] and precert submitted.  LEON, PASRR and transport form completed.    Awaiting authorization.    Electronically signed by FLORIAN Zazueta on 4/2/2024 at 8:18 AM           Precert approved for [facility].    Electronically signed by FLORIAN Zazueta on 4/2/2024 at 10:26 AM

## 2024-04-02 NOTE — PLAN OF CARE
Patient's chart updated to reflect:      .    - HF care plan, HF education points and HF discharge instructions.  -Orders:daily weights, I/O.  -PCP and cardiology follow up appointments to be scheduled within 7 days of hospital discharge.  -CHF education session will be provided to the patient prior to hospital discharge.    Gala Quinones RN   Heart Failure Navigator

## 2024-04-02 NOTE — PLAN OF CARE
Problem: Discharge Planning  Goal: Discharge to home or other facility with appropriate resources  4/2/2024 0040 by Sania Diaz RN  Outcome: Progressing  Flowsheets (Taken 4/1/2024 2030)  Discharge to home or other facility with appropriate resources:   Identify barriers to discharge with patient and caregiver   Arrange for needed discharge resources and transportation as appropriate     Problem: Safety - Adult  Goal: Free from fall injury  4/2/2024 0040 by Sania Diaz RN  Outcome: Progressing     Problem: ABCDS Injury Assessment  Goal: Absence of physical injury  4/2/2024 0040 by Sania Diaz, RN  Outcome: Progressing     Problem: Chronic Conditions and Co-morbidities  Goal: Patient's chronic conditions and co-morbidity symptoms are monitored and maintained or improved  4/2/2024 0040 by Sania Diaz, RN  Outcome: Progressing

## 2024-04-03 VITALS
OXYGEN SATURATION: 100 % | WEIGHT: 180.5 LBS | DIASTOLIC BLOOD PRESSURE: 77 MMHG | RESPIRATION RATE: 18 BRPM | HEIGHT: 69 IN | HEART RATE: 83 BPM | BODY MASS INDEX: 26.73 KG/M2 | TEMPERATURE: 97.8 F | SYSTOLIC BLOOD PRESSURE: 104 MMHG

## 2024-04-03 LAB
ALBUMIN SERPL-MCNC: 3.1 G/DL (ref 3.5–5.2)
ALP SERPL-CCNC: 82 U/L (ref 40–129)
ALT SERPL-CCNC: 5 U/L (ref 0–40)
ANION GAP SERPL CALCULATED.3IONS-SCNC: 8 MMOL/L (ref 7–16)
AST SERPL-CCNC: 13 U/L (ref 0–39)
BASOPHILS # BLD: 0.02 K/UL (ref 0–0.2)
BASOPHILS NFR BLD: 0 % (ref 0–2)
BILIRUB SERPL-MCNC: 0.8 MG/DL (ref 0–1.2)
BUN SERPL-MCNC: 31 MG/DL (ref 6–23)
CALCIUM SERPL-MCNC: 8.4 MG/DL (ref 8.6–10.2)
CHLORIDE SERPL-SCNC: 104 MMOL/L (ref 98–107)
CO2 SERPL-SCNC: 26 MMOL/L (ref 22–29)
CREAT SERPL-MCNC: 1.5 MG/DL (ref 0.7–1.2)
EOSINOPHIL # BLD: 0.03 K/UL (ref 0.05–0.5)
EOSINOPHILS RELATIVE PERCENT: 1 % (ref 0–6)
ERYTHROCYTE [DISTWIDTH] IN BLOOD BY AUTOMATED COUNT: 15.6 % (ref 11.5–15)
GFR SERPL CREATININE-BSD FRML MDRD: 43 ML/MIN/1.73M2
GLUCOSE BLD-MCNC: 113 MG/DL (ref 74–99)
GLUCOSE BLD-MCNC: 169 MG/DL (ref 74–99)
GLUCOSE SERPL-MCNC: 115 MG/DL (ref 74–99)
HCT VFR BLD AUTO: 32.2 % (ref 37–54)
HGB BLD-MCNC: 9.9 G/DL (ref 12.5–16.5)
IMM GRANULOCYTES # BLD AUTO: 0.03 K/UL (ref 0–0.58)
IMM GRANULOCYTES NFR BLD: 1 % (ref 0–5)
LYMPHOCYTES NFR BLD: 1.02 K/UL (ref 1.5–4)
LYMPHOCYTES RELATIVE PERCENT: 15 % (ref 20–42)
MAGNESIUM SERPL-MCNC: 2.3 MG/DL (ref 1.6–2.6)
MCH RBC QN AUTO: 31 PG (ref 26–35)
MCHC RBC AUTO-ENTMCNC: 30.7 G/DL (ref 32–34.5)
MCV RBC AUTO: 100.9 FL (ref 80–99.9)
MONOCYTES NFR BLD: 1.04 K/UL (ref 0.1–0.95)
MONOCYTES NFR BLD: 16 % (ref 2–12)
NEUTROPHILS NFR BLD: 68 % (ref 43–80)
NEUTS SEG NFR BLD: 4.5 K/UL (ref 1.8–7.3)
PLATELET, FLUORESCENCE: 138 K/UL (ref 130–450)
PMV BLD AUTO: 11.1 FL (ref 7–12)
POTASSIUM SERPL-SCNC: 3.6 MMOL/L (ref 3.5–5)
PROT SERPL-MCNC: 5.9 G/DL (ref 6.4–8.3)
RBC # BLD AUTO: 3.19 M/UL (ref 3.8–5.8)
SODIUM SERPL-SCNC: 138 MMOL/L (ref 132–146)
WBC OTHER # BLD: 6.6 K/UL (ref 4.5–11.5)

## 2024-04-03 PROCEDURE — 80053 COMPREHEN METABOLIC PANEL: CPT

## 2024-04-03 PROCEDURE — 82962 GLUCOSE BLOOD TEST: CPT

## 2024-04-03 PROCEDURE — 6370000000 HC RX 637 (ALT 250 FOR IP): Performed by: INTERNAL MEDICINE

## 2024-04-03 PROCEDURE — 99239 HOSP IP/OBS DSCHRG MGMT >30: CPT | Performed by: INTERNAL MEDICINE

## 2024-04-03 PROCEDURE — 36415 COLL VENOUS BLD VENIPUNCTURE: CPT

## 2024-04-03 PROCEDURE — 2580000003 HC RX 258: Performed by: INTERNAL MEDICINE

## 2024-04-03 PROCEDURE — 83735 ASSAY OF MAGNESIUM: CPT

## 2024-04-03 PROCEDURE — 85025 COMPLETE CBC W/AUTO DIFF WBC: CPT

## 2024-04-03 RX ORDER — FUROSEMIDE 20 MG/1
40 TABLET ORAL 2 TIMES DAILY
Qty: 60 TABLET | Refills: 3 | Status: SHIPPED | OUTPATIENT
Start: 2024-04-03 | End: 2024-04-03

## 2024-04-03 RX ORDER — FUROSEMIDE 20 MG/1
40 TABLET ORAL 2 TIMES DAILY
Qty: 60 TABLET | Refills: 3 | DISCHARGE
Start: 2024-04-03

## 2024-04-03 RX ORDER — ISOSORBIDE MONONITRATE 30 MG/1
30 TABLET, EXTENDED RELEASE ORAL DAILY
Qty: 30 TABLET | Refills: 3 | Status: SHIPPED | OUTPATIENT
Start: 2024-04-04

## 2024-04-03 RX ADMIN — FOLIC ACID 1 MG: 1 TABLET ORAL at 09:56

## 2024-04-03 RX ADMIN — SODIUM CHLORIDE, PRESERVATIVE FREE 10 ML: 5 INJECTION INTRAVENOUS at 09:56

## 2024-04-03 RX ADMIN — METOPROLOL TARTRATE 12.5 MG: 25 TABLET, FILM COATED ORAL at 09:56

## 2024-04-03 RX ADMIN — APIXABAN 2.5 MG: 2.5 TABLET, FILM COATED ORAL at 09:56

## 2024-04-03 RX ADMIN — FAMOTIDINE 20 MG: 20 TABLET ORAL at 09:56

## 2024-04-03 RX ADMIN — ISOSORBIDE MONONITRATE 30 MG: 30 TABLET, EXTENDED RELEASE ORAL at 09:56

## 2024-04-03 NOTE — CARE COORDINATION
Social work / Discharge planning:        Discharge to [facility] at 4pm via facility van.    Social work updated charge nurse and patient's daughter Radha.    Electronically signed by FLORIAN Zazueta on 4/3/2024 at 2:31 PM

## 2024-04-03 NOTE — PROGRESS NOTES
[facility] Hospitalist   Progress Note    Admitting Date and Time: 3/31/2024  2:25 PM  Admit Dx: Acute heart failure, unspecified heart failure type (HCC) [I50.9]    Subjective:    Patient was admitted with Acute heart failure, unspecified heart failure type (HCC) [I50.9]. Patient denies fever, chills, cp, sob, n/v.     apixaban  2.5 mg Oral BID    carvedilol  6.25 mg Oral BID WC    famotidine  20 mg Oral Daily    folic acid  1 mg Oral Daily    isosorbide mononitrate  30 mg Oral Daily    mirtazapine  15 mg Oral Nightly    pravastatin  40 mg Oral Nightly    Vitamin D  1,000 Units Oral Daily    sodium chloride flush  5-40 mL IntraVENous 2 times per day    insulin lispro  0-4 Units SubCUTAneous TID WC    insulin lispro  0-4 Units SubCUTAneous Nightly     perflutren lipid microspheres, 1.5 mL, ONCE PRN  sodium chloride flush, 5-40 mL, PRN  sodium chloride, , PRN  polyethylene glycol, 17 g, Daily PRN  dextrose bolus, 125 mL, PRN   Or  dextrose bolus, 250 mL, PRN  glucagon (rDNA), 1 mg, PRN  dextrose, , Continuous PRN  acetaminophen, 650 mg, Q6H PRN   Or  acetaminophen, 650 mg, Q6H PRN  prochlorperazine, 10 mg, Q6H PRN   Or  prochlorperazine, 10 mg, Q6H PRN  Glucose, 15 g, PRN         Objective:    BP 96/62   Pulse 97   Temp 97.6 °F (36.4 °C) (Oral)   Resp 18   Ht 1.753 m (5' 9\")   Wt 86.2 kg (190 lb)   SpO2 98%   BMI 28.06 kg/m²   Skin: warm and dry, no rash or erythema  Pulmonary/Chest: clear to auscultation bilaterally- no wheezes, rales or rhonchi, normal air movement, no respiratory distress  Cardiovascular: rhythm reg at rate of 96  Abdomen: soft, non-tender, non-distended, normal bowel sounds, no masses or organomegaly  Extremities: no cyanosis, no clubbing, and pos for 1+ b/l le edema      Recent Labs     03/31/24  1500 04/01/24  0607    141   K 3.7 3.7   CL 95* 104   CO2 26 29   BUN 42* 38*   CREATININE 2.0* 1.7*   GLUCOSE 177* 101*   CALCIUM 9.0 8.7       Recent Labs     
     [facility] Hospitalist   Progress Note    Admitting Date and Time: 3/31/2024  2:25 PM  Admit Dx: Acute heart failure, unspecified heart failure type (HCC) [I50.9]  Acute on chronic heart failure, unspecified heart failure type (HCC) [I50.9]    Subjective:    Patient was admitted with Acute heart failure, unspecified heart failure type (HCC) [I50.9]  Acute on chronic heart failure, unspecified heart failure type (HCC) [I50.9]. Patient denies fever, chills, cp, sob, n/v.     metoprolol tartrate  12.5 mg Oral BID    apixaban  2.5 mg Oral BID    famotidine  20 mg Oral Daily    folic acid  1 mg Oral Daily    isosorbide mononitrate  30 mg Oral Daily    mirtazapine  15 mg Oral Nightly    pravastatin  40 mg Oral Nightly    Vitamin D  1,000 Units Oral Daily    sodium chloride flush  5-40 mL IntraVENous 2 times per day    insulin lispro  0-4 Units SubCUTAneous TID WC    insulin lispro  0-4 Units SubCUTAneous Nightly     perflutren lipid microspheres, 1.5 mL, ONCE PRN  sodium chloride flush, 5-40 mL, PRN  sodium chloride, , PRN  polyethylene glycol, 17 g, Daily PRN  dextrose bolus, 125 mL, PRN   Or  dextrose bolus, 250 mL, PRN  glucagon (rDNA), 1 mg, PRN  dextrose, , Continuous PRN  acetaminophen, 650 mg, Q6H PRN   Or  acetaminophen, 650 mg, Q6H PRN  prochlorperazine, 10 mg, Q6H PRN   Or  prochlorperazine, 10 mg, Q6H PRN  Glucose, 15 g, PRN         Objective:    /62   Pulse 97   Temp 98.2 °F (36.8 °C) (Oral)   Resp 16   Ht 1.753 m (5' 9\")   Wt 84.1 kg (185 lb 4.8 oz)   SpO2 99%   BMI 27.36 kg/m²   Skin: warm and dry, no rash or erythema  Pulmonary/Chest: clear to auscultation bilaterally- no wheezes, rales or rhonchi, normal air movement, no respiratory distress  Cardiovascular: rhythm reg at rate of 96  Abdomen: soft, non-tender, non-distended, normal bowel sounds, no masses or organomegaly  Extremities: no cyanosis, no clubbing, and no edema      Recent Labs     03/31/24  1500 04/01/24  0607 
    INPATIENT CARDIOLOGY FOLLOW-UP    Name: Young Watkins    Age: 89 y.o.    Date of Admission: 3/31/2024  2:25 PM    Date of Service: 4/2/2024    Primary Cardiologist: New to me, follows with [facility]    Chief Complaint: Follow-up for syncope and collapse    Interim History:  No new overnight cardiac complaints. Currently with no complaints of CP, SOB, palpitations, dizziness, or lightheadedness.  A sensed V paced rhythm.  Occasional PVC seen.    No new complaints.  Complains of bilateral foot pain.  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    Ischemic cardiomyopathy    VHD (valvular heart disease)       Current Medications:    Current Facility-Administered Medications:     perflutren lipid microspheres (DEFINITY) injection 1.5 mL, 1.5 mL, IntraVENous, ONCE PRN, Nida Dalal, APRN - CNP    apixaban (ELIQUIS) tablet 2.5 mg, 2.5 mg, Oral, BID, Hric, Pawan, DO, 2.5 mg at 04/02/24 0900    carvedilol (COREG) tablet 6.25 mg, 6.25 mg, Oral, BID WC, Hric, Pawan, DO, 6.25 mg at 04/02/24 0802    famotidine (PEPCID) tablet 20 mg, 20 mg, Oral, Daily, Hric, Pawan, DO, 20 mg at 04/02/24 0900    folic acid (FOLVITE) tablet 1 mg, 1 mg, Oral, Daily, Hric, Pawan, DO, 1 mg at 04/02/24 0859    isosorbide mononitrate (IMDUR) extended release tablet 30 mg, 30 mg, Oral, Daily, Hric, Pawan, DO, 30 mg at 04/02/24 0900    mirtazapine (REMERON) tablet 15 mg, 15 mg, Oral, Nightly, Hric, Pawan, DO, 15 mg at 04/01/24 2037    pravastatin (PRAVACHOL) tablet 40 mg, 40 mg, Oral, Nightly, Hric, Pawan, DO, 40 mg at 04/01/24 7697    Vitamin D 
Gave nurse to nurse report to nurse at [facility].   
New consult sent to Dr Mcdonough via mParticle message.  
Notified Dr. Ocasio that patient had 5 beats of v tach.  
Occupational Therapy  OT BEDSIDE TREATMENT NOTE      Date:2024  Patient Name: Young Watkins  MRN: [id number]  : 1930  Room: [address] [place] [address]         Evaluating OT: Shoshana Gaona OTR/BALDO [id number]       Referring Provider:Pawan Vaughan DO     Specific Provider Orders/Date: 3- OT eval and treat        Diagnosis:    Acute heart failure, unspecified heart failure type (HCC) I50.9           Pertinent Medical History: None on file       Past Medical History   No past medical history on file.         Past Surgical History   No past surgical history on file.      Precautions:  Fall Risk, very [place]       Assessment of current deficits    [] Functional mobility            [x]ADLs           [x] Strength                  [x]Cognition    [x] Functional transfers          [x] IADLs         [x] Safety Awareness   [x]Endurance    [x] Fine Coordination                         [x] Balance      [] Vision/perception   [x]Sensation      []Gross Motor Coordination             [] ROM           [] Delirium                   [] Motor Control      OT PLAN OF CARE   OT POC based on physician orders, patient diagnosis and results of clinical assessment     Frequency/Duration 2-5 days/wk for 2 weeks PRN   Specific OT Treatment Interventions to include:   * Instruction/training on adapted ADL techniques and AE recommendations to increase functional independence within precautions       * Training on energy conservation strategies, correct breathing pattern and techniques to improve independence/tolerance for self-care routine  * Functional transfer/mobility training/DME recommendations for increased independence, safety, and fall prevention  * Patient/Family education to increase follow through with safety techniques and functional independence  * Recommendation of environmental modifications for increased safety with functional transfers/mobility and ADLs  * Therapeutic exercise to improve motor endurance, ROM, and functional strength 
Patient's glucose measured at 72. Gave 4 oz juice; continuing to monitor.  
Physical Therapy  Facility/Department: [address] MED SURG  Physical Therapy Initial Assessment    Name: Young Watkins  : 1930  MRN: [id number]  Date of Service: 2024          Patient Diagnosis(es): The primary encounter diagnosis was Near syncope. Diagnoses of Shock (HCC), Chronic congestive heart failure, unspecified heart failure type (HCC), and Lactic acidosis were also pertinent to this visit.  Past Medical History:  has no past medical history on file.  Past Surgical History:  has no past surgical history on file.         Requires PT Follow-Up: Yes     Evaluating Therapist: Carmencita Claire PT     Referring Provider:      Pawan Vaughan DO       PT order : PT eval and treat     Room #: 620   DIAGNOSIS: The primary encounter diagnosis was Near syncope. Diagnoses of Shock (HCC), Chronic congestive heart failure, unspecified heart failure type (HCC), and Lactic acidosis were also pertinent to this visit.    PRECAUTIONS: falls , [place]     Social:  Pt lives alone  in a  1  floor plan 3 steps and 1 rails to enter.  Prior to admission pt walked with ww     Initial Evaluation  Date:  2024  Treatment      Short Term/ Long Term   Goals   Was pt agreeable to Eval/treatment?  Yes      Does pt have pain? None reported      Bed Mobility  Rolling: NT   Supine to sit: min assist   Sit to supine:  NT   Scooting:  SBA in sit    Independent    Transfers Sit to stand:  CGA /min assist   Stand to sit:  CGA  Stand pivot:  NT    Independent    Ambulation     140  feet with  ww  with  CGA   200  feet with ww  with  independent        Stair negotiation: ascended and descended NT    4  steps with  1  rail with  SBA/CGA   LE ROM  WFL     LE strength  4-/ 5   4/ 5    AM- PAC RAW score   17/ 24            Pt is alert and Oriented      Balance: CGA . Fall risk due to [x]Decreased strength, [x] Decreased balance, [x] Decreased safety awareness, [x] Other : recent  falls     Endurance: WFl, but decreased   Bed/Chair alarm:  yes    
SPIRITUAL HEALTH SERVICES - [facility] Encounter    Name: Young Watkins                  Referral: Routine Visit    Sacraments  Anointed (Last Rites): Yes  A[facility]olic [facility] [place]: No  Confession: No  Communion: Yes     Assessment:  Patient receptive to  visit.      Intervention:   provided spiritual support and sacramental ministry for patient.     Outcome:  Patient expressed gratitude for visit.    Plan:  Chaplains will remain available to offer spiritual and emotional support as needed.      Electronically signed by Chaplain Montez, on 4/1/2024 at 4:54 PM.  Spiritual Care Department  [facility]  [phone]   
endurance, ROM, and functional strength for ADLs/functional transfers  * Therapeutic activities to facilitate/challenge dynamic balance, stand tolerance for increased safety and independence with ADLs  * Therapeutic activities to facilitate gross/fine motor skills for increased independence with ADLs    Recommended Adaptive Equipment:  TBD     Home Living: Pt lives alone 1 story home with 3 steps in    Bathroom setup: walk in shower with a seat and a hi-rise commode with grab bars   Equipment owned: FWW, shower seat     Prior Level of Function: Pt has HHC coming in 2 x/ week with IADl's but is I with ADLs , ambulated with FWW  Driving: no  Occupation: no    Pain Level: no pain indicated ;  Cognition: A&O: pleasant and grossly oriented.  Follows 1-2 step directions      Functional Assessment:  AM-PAC Daily Activity Raw Score: 17/24   Initial Eval Status  Date: 4/1/2024 Treatment Status  Date: STGs = LTGs  Time frame: 10-14 days   Feeding Minimal Assist      Grooming Minimal Assist   Independent    UB Dressing Minimal Assist   Independent    LB Dressing Moderate Assist   Independent    Bathing Moderate Assist  Independent    Toileting Minimal Assist   Independent    Bed Mobility  Supine to sit: Minimal Assist   Sit to supine: NT    Supine to sit: Independent   Sit to supine: Independent    Functional Transfers Minimal Assist   Sit to stand from EOB   Independent    Functional Mobility Contact Guard Assist   Independent    Balance Sitting:     Static:  Good    Dynamic:Good-  Standing: CGA     Activity Tolerance Fair  Good    Visual/  Perceptual Glasses: no                Hand Dominance R   AROM (PROM) Strength Additional Info:    RUE  WFL WFL good  and wfl FMC/dexterity noted during ADL tasks       LUE WFL WFL  good  and wfl FMC/dexterity noted during ADL tasks       Hearing: very [place]   Sensation:  No c/o numbness or tingling   Tone: WFL   Edema: none noted     Comments: Upon arrival patient supine in bed with 
0802 125/75 -- -- 85 -- -- --   04/02/24 0730 125/75 99.2 °F (37.3 °C) Oral 85 17 98 % --   04/02/24 0415 (!) 97/55 98.5 °F (36.9 °C) Axillary 81 18 95 % --   04/02/24 0100 -- -- -- -- -- -- 84.1 kg (185 lb 4.8 oz)   04/01/24 2345 107/66 97.8 °F (36.6 °C) Axillary 84 18 98 % --   04/01/24 1943 99/70 97 °F (36.1 °C) Axillary 81 18 100 % --   04/01/24 1530 108/64 97.3 °F (36.3 °C) Axillary 91 18 98 % --   04/01/24 1116 96/62 97.6 °F (36.4 °C) Oral 97 18 98 % --         Intake/Output Summary (Last 24 hours) at 4/2/2024 1042  Last data filed at 4/1/2024 1943  Gross per 24 hour   Intake --   Output 700 ml   Net -700 ml       Constitutional: Patient in no acute distress   Head: normocephalic, atraumatic   Neck: supple, no jvd  Cardiovascular: regular rate and rhythm, no murmurs, gallops, or rubs   Respiratory: Clear, no rales, rhochi, or wheezes,   Gastrointestinal: soft, nontender, nondistended, no hepatosplenomegaly  Ext: edema  Neuro: aaox3  Skin: dry, no rash   Back: nontender    Data:    Recent Labs     03/31/24  1500 04/01/24  0607 04/02/24  0430   WBC 8.0 5.7 6.5   HGB 12.2* 11.1* 10.2*   HCT 39.7 35.8* 33.6*   .3* 98.1 100.6*    121*  --        Recent Labs     03/31/24  1500 04/01/24  0607 04/02/24  0430    141 136   K 3.7 3.7 3.6   CL 95* 104 103   CO2 26 29 28   CREATININE 2.0* 1.7* 1.7*   BUN 42* 38* 36*   LABGLOM 31* 38* 37*   GLUCOSE 177* 101* 130*   CALCIUM 9.0 8.7 8.6   PHOS  --  3.9  --    MG 2.5 2.5  --        No results found for: \"VITD25\"    No results found for: \"PTH\"    Recent Labs     03/31/24  1500 04/01/24  0607 04/02/24  0430   ALT 7 5 <5   AST 18 15 14   ALKPHOS 95 79 77   BILITOT 1.1 0.8 0.6       Recent Labs     03/31/24  1500 04/01/24  0607 04/02/24  0430   LABALBU 3.6 3.5 3.2*       No results found for: \"FERRITIN\", \"IRON\", \"TIBC\"    Vitamin B-12   Date Value Ref Range Status   04/01/2024 804 211 - 946 pg/mL Final       Folate   Date Value Ref Range Status   04/01/2024 
EMU
Vitals for the past 24 hrs:   BP Temp Temp src Pulse Resp SpO2 Weight   04/03/24 1107 104/77 97.8 °F (36.6 °C) Axillary 83 18 97 % --   04/03/24 0945 109/75 -- -- 92 -- -- --   04/03/24 0742 100/73 97.7 °F (36.5 °C) Oral 90 16 98 % --   04/03/24 0415 99/61 97.6 °F (36.4 °C) Oral 77 16 98 % --   04/03/24 0025 -- -- -- -- -- -- 81.9 kg (180 lb 8 oz)   04/02/24 2300 105/76 97.8 °F (36.6 °C) Oral 82 16 94 % --   04/02/24 2030 107/70 97.7 °F (36.5 °C) Oral 77 16 100 % --   04/02/24 2001 105/68 98.3 °F (36.8 °C) Oral 80 16 100 % --   04/02/24 1630 101/62 98.2 °F (36.8 °C) Oral 97 16 99 % --         Intake/Output Summary (Last 24 hours) at 4/3/2024 1119  Last data filed at 4/3/2024 0945  Gross per 24 hour   Intake 360 ml   Output 900 ml   Net -540 ml       Constitutional: Patient in no acute distress   Head: normocephalic, atraumatic   Neck: supple, no jvd  Cardiovascular: regular rate and rhythm, no murmurs, gallops, or rubs   Respiratory: Clear, no rales, rhochi, or wheezes,   Gastrointestinal: soft, nontender, nondistended, no hepatosplenomegaly  Ext: edema  Neuro: aaox3  Skin: dry, no rash   Back: nontender    Data:    Recent Labs     03/31/24  1500 04/01/24  0607 04/02/24  0430 04/03/24  0452   WBC 8.0 5.7 6.5 6.6   HGB 12.2* 11.1* 10.2* 9.9*   HCT 39.7 35.8* 33.6* 32.2*   .3* 98.1 100.6* 100.9*    121*  --   --        Recent Labs     03/31/24  1500 04/01/24  0607 04/02/24 0430 04/03/24 0452    141 136 138   K 3.7 3.7 3.6 3.6   CL 95* 104 103 104   CO2 26 29 28 26   CREATININE 2.0* 1.7* 1.7* 1.5*   BUN 42* 38* 36* 31*   LABGLOM 31* 38* 37* 43*   GLUCOSE 177* 101* 130* 115*   CALCIUM 9.0 8.7 8.6 8.4*   PHOS  --  3.9  --   --    MG 2.5 2.5  --  2.3       No results found for: \"VITD25\"    No results found for: \"PTH\"    Recent Labs     04/01/24  0607 04/02/24 0430 04/03/24 0452   ALT 5 <5 5   AST 15 14 13   ALKPHOS 79 77 82   BILITOT 0.8 0.6 0.8       Recent Labs     04/01/24 0607 04/02/24 0430

## 2024-04-03 NOTE — CARE COORDINATION
Social work / Discharge planning:         Precert for [facility] expires today.     LEON, PASRR and transport form completed.    Per primary, awaiting ok from cardiology.    Electronically signed by FLORIAN Zazueta on 4/3/2024 at 11:51 AM

## 2024-04-03 NOTE — DISCHARGE SUMMARY
abdominal pain discharged, pt denied fevers, chills,n/v. Discharge planning d/w pt. Time given for questions and all questions answered.       Discharge Exam:  Vitals:    04/03/24 0742 04/03/24 0945 04/03/24 1107 04/03/24 1214   BP: 100/73 109/75 104/77    Pulse: 90 92 83    Resp: 16  18    Temp: 97.7 °F (36.5 °C)  97.8 °F (36.6 °C)    TempSrc: Oral  Axillary    SpO2: 98%  97% 100%   Weight:       Height:           Skin: warm and dry, no rash or erythema  Pulmonary/Chest: clear to auscultation bilaterally- no wheezes, rales or rhonchi, normal air movement, no respiratory distress  Cardiovascular: rhythm reg at rate of 88  Abdomen: soft, non-tender, non-distended, normal bowel sounds, no masses or organomegaly  Extremities: no cyanosis, no clubbing, and no edema  I/O last 3 completed shifts:  In: 360 [P.O.:360]  Out: 1600 [Urine:1600]  I/O this shift:  In: 180 [P.O.:180]  Out: -       LABS:  Recent Labs     04/01/24  0607 04/02/24  0430 04/03/24  0452    136 138   K 3.7 3.6 3.6    103 104   CO2 29 28 26   BUN 38* 36* 31*   CREATININE 1.7* 1.7* 1.5*   GLUCOSE 101* 130* 115*   CALCIUM 8.7 8.6 8.4*       Recent Labs     04/01/24  0607 04/02/24  0430 04/03/24  0452   WBC 5.7 6.5 6.6   RBC 3.65* 3.34* 3.19*   HGB 11.1* 10.2* 9.9*   HCT 35.8* 33.6* 32.2*   MCV 98.1 100.6* 100.9*   MCH 30.4 30.5 31.0   MCHC 31.0* 30.4* 30.7*   RDW 15.7* 15.6* 15.6*   *  --   --    MPV 10.7 11.0 11.1       Recent Labs     04/02/24  1610 04/02/24  2111 04/03/24  0645 04/03/24  1054   POCGLU 152* 208* 113* 169*       CBC with Differential:    Lab Results   Component Value Date/Time    WBC 6.6 04/03/2024 04:52 AM    RBC 3.19 04/03/2024 04:52 AM    HGB 9.9 04/03/2024 04:52 AM    HCT 32.2 04/03/2024 04:52 AM     04/01/2024 06:07 AM    .9 04/03/2024 04:52 AM    MCH 31.0 04/03/2024 04:52 AM    MCHC 30.7 04/03/2024 04:52 AM    RDW 15.6 04/03/2024 04:52 AM    LYMPHOPCT 15 04/03/2024 04:52 AM    MONOPCT 16 04/03/2024 04:52 AM

## 2024-04-08 LAB
ALBUMIN SERPL-MCNC: 3.1 G/DL (ref 3.5–5.2)
ALP SERPL-CCNC: 111 U/L (ref 40–129)
ALT SERPL-CCNC: 13 U/L (ref 0–40)
ANION GAP SERPL CALCULATED.3IONS-SCNC: 13 MMOL/L (ref 7–16)
AST SERPL-CCNC: 21 U/L (ref 0–39)
BASOPHILS # BLD: 0.02 K/UL (ref 0–0.2)
BASOPHILS NFR BLD: 0 % (ref 0–2)
BILIRUB SERPL-MCNC: 0.5 MG/DL (ref 0–1.2)
BNP SERPL-MCNC: 9498 PG/ML (ref 0–450)
BUN SERPL-MCNC: 34 MG/DL (ref 6–23)
CALCIUM SERPL-MCNC: 8.7 MG/DL (ref 8.6–10.2)
CHLORIDE SERPL-SCNC: 99 MMOL/L (ref 98–107)
CO2 SERPL-SCNC: 27 MMOL/L (ref 22–29)
CREAT SERPL-MCNC: 1.7 MG/DL (ref 0.7–1.2)
EOSINOPHIL # BLD: 0.08 K/UL (ref 0.05–0.5)
EOSINOPHILS RELATIVE PERCENT: 2 % (ref 0–6)
ERYTHROCYTE [DISTWIDTH] IN BLOOD BY AUTOMATED COUNT: 15.3 % (ref 11.5–15)
GFR SERPL CREATININE-BSD FRML MDRD: 38 ML/MIN/1.73M2
GLUCOSE SERPL-MCNC: 93 MG/DL (ref 74–99)
HCT VFR BLD AUTO: 32.7 % (ref 37–54)
HGB BLD-MCNC: 10.6 G/DL (ref 12.5–16.5)
IMM GRANULOCYTES # BLD AUTO: <0.03 K/UL (ref 0–0.58)
IMM GRANULOCYTES NFR BLD: 0 % (ref 0–5)
LYMPHOCYTES NFR BLD: 1.02 K/UL (ref 1.5–4)
LYMPHOCYTES RELATIVE PERCENT: 20 % (ref 20–42)
MCH RBC QN AUTO: 31.2 PG (ref 26–35)
MCHC RBC AUTO-ENTMCNC: 32.4 G/DL (ref 32–34.5)
MCV RBC AUTO: 96.2 FL (ref 80–99.9)
MONOCYTES NFR BLD: 0.76 K/UL (ref 0.1–0.95)
MONOCYTES NFR BLD: 15 % (ref 2–12)
NEUTROPHILS NFR BLD: 64 % (ref 43–80)
NEUTS SEG NFR BLD: 3.34 K/UL (ref 1.8–7.3)
PLATELET # BLD AUTO: 187 K/UL (ref 130–450)
PMV BLD AUTO: 11.8 FL (ref 7–12)
POTASSIUM SERPL-SCNC: 3.7 MMOL/L (ref 3.5–5)
PROT SERPL-MCNC: 6.1 G/DL (ref 6.4–8.3)
RBC # BLD AUTO: 3.4 M/UL (ref 3.8–5.8)
SODIUM SERPL-SCNC: 139 MMOL/L (ref 132–146)
WBC OTHER # BLD: 5.2 K/UL (ref 4.5–11.5)

## 2024-04-15 LAB
HCT VFR BLD AUTO: 32.3 % (ref 37–54)
HGB BLD-MCNC: 10.1 G/DL (ref 12.5–16.5)

## 2024-12-06 ENCOUNTER — APPOINTMENT (OUTPATIENT)
Dept: CT IMAGING | Age: 89
End: 2024-12-06
Payer: MEDICARE

## 2024-12-06 ENCOUNTER — HOSPITAL ENCOUNTER (EMERGENCY)
Age: 89
Discharge: HOME | End: 2024-12-07
Attending: STUDENT IN AN ORGANIZED HEALTH CARE EDUCATION/TRAINING PROGRAM
Payer: MEDICARE

## 2024-12-06 DIAGNOSIS — R31.9 URINARY TRACT INFECTION WITH HEMATURIA, SITE UNSPECIFIED: Primary | ICD-10-CM

## 2024-12-06 DIAGNOSIS — R31.9 HEMATURIA, UNSPECIFIED TYPE: ICD-10-CM

## 2024-12-06 DIAGNOSIS — N39.0 URINARY TRACT INFECTION WITH HEMATURIA, SITE UNSPECIFIED: Primary | ICD-10-CM

## 2024-12-06 LAB
ALBUMIN SERPL-MCNC: 3.1 G/DL (ref 3.5–5.2)
ALP SERPL-CCNC: 140 U/L (ref 40–129)
ALT SERPL-CCNC: 8 U/L (ref 0–40)
ANION GAP SERPL CALCULATED.3IONS-SCNC: 10 MMOL/L (ref 7–16)
AST SERPL-CCNC: 45 U/L (ref 0–39)
BASOPHILS # BLD: 0.01 K/UL (ref 0–0.2)
BASOPHILS NFR BLD: 0 % (ref 0–2)
BILIRUB SERPL-MCNC: 0.5 MG/DL (ref 0–1.2)
BILIRUB UR QL STRIP: NEGATIVE
BUN SERPL-MCNC: 49 MG/DL (ref 6–23)
CALCIUM SERPL-MCNC: 8.7 MG/DL (ref 8.6–10.2)
CASTS #/AREA URNS LPF: ABNORMAL /LPF
CHLORIDE SERPL-SCNC: 102 MMOL/L (ref 98–107)
CLARITY UR: ABNORMAL
CO2 SERPL-SCNC: 24 MMOL/L (ref 22–29)
COLOR UR: ABNORMAL
CREAT SERPL-MCNC: 1.8 MG/DL (ref 0.7–1.2)
EOSINOPHIL # BLD: 0.03 K/UL (ref 0.05–0.5)
EOSINOPHILS RELATIVE PERCENT: 0 % (ref 0–6)
ERYTHROCYTE [DISTWIDTH] IN BLOOD BY AUTOMATED COUNT: 15.3 % (ref 11.5–15)
GFR, ESTIMATED: 35 ML/MIN/1.73M2
GLUCOSE SERPL-MCNC: 140 MG/DL (ref 74–99)
GLUCOSE UR STRIP-MCNC: >=1000 MG/DL
HCT VFR BLD AUTO: 34.7 % (ref 37–54)
HGB BLD-MCNC: 10.7 G/DL (ref 12.5–16.5)
HGB UR QL STRIP.AUTO: ABNORMAL
IMM GRANULOCYTES # BLD AUTO: 0.06 K/UL (ref 0–0.58)
IMM GRANULOCYTES NFR BLD: 1 % (ref 0–5)
KETONES UR STRIP-MCNC: NEGATIVE MG/DL
LEUKOCYTE ESTERASE UR QL STRIP: ABNORMAL
LYMPHOCYTES NFR BLD: 0.53 K/UL (ref 1.5–4)
LYMPHOCYTES RELATIVE PERCENT: 7 % (ref 20–42)
MCH RBC QN AUTO: 30.4 PG (ref 26–35)
MCHC RBC AUTO-ENTMCNC: 30.8 G/DL (ref 32–34.5)
MCV RBC AUTO: 98.6 FL (ref 80–99.9)
MONOCYTES NFR BLD: 1.08 K/UL (ref 0.1–0.95)
MONOCYTES NFR BLD: 14 % (ref 2–12)
NEUTROPHILS NFR BLD: 78 % (ref 43–80)
NEUTS SEG NFR BLD: 6.03 K/UL (ref 1.8–7.3)
NITRITE UR QL STRIP: NEGATIVE
PH UR STRIP: 8.5 [PH] (ref 5–9)
PLATELET # BLD AUTO: 151 K/UL (ref 130–450)
PMV BLD AUTO: 12 FL (ref 7–12)
POTASSIUM SERPL-SCNC: 4 MMOL/L (ref 3.5–5)
PROT SERPL-MCNC: 6.3 G/DL (ref 6.4–8.3)
PROT UR STRIP-MCNC: 100 MG/DL
RBC # BLD AUTO: 3.52 M/UL (ref 3.8–5.8)
RBC #/AREA URNS HPF: ABNORMAL /HPF
SODIUM SERPL-SCNC: 136 MMOL/L (ref 132–146)
SP GR UR STRIP: 1.02 (ref 1–1.03)
UROBILINOGEN UR STRIP-ACNC: 0.2 EU/DL (ref 0–1)
WBC #/AREA URNS HPF: ABNORMAL /HPF
WBC OTHER # BLD: 7.7 K/UL (ref 4.5–11.5)

## 2024-12-06 PROCEDURE — 6360000002 HC RX W HCPCS

## 2024-12-06 PROCEDURE — 87086 URINE CULTURE/COLONY COUNT: CPT

## 2024-12-06 PROCEDURE — 2580000003 HC RX 258

## 2024-12-06 PROCEDURE — 99284 EMERGENCY DEPT VISIT MOD MDM: CPT

## 2024-12-06 PROCEDURE — 80053 COMPREHEN METABOLIC PANEL: CPT

## 2024-12-06 PROCEDURE — 74176 CT ABD & PELVIS W/O CONTRAST: CPT

## 2024-12-06 PROCEDURE — 71250 CT THORAX DX C-: CPT

## 2024-12-06 PROCEDURE — 85025 COMPLETE CBC W/AUTO DIFF WBC: CPT

## 2024-12-06 PROCEDURE — 96374 THER/PROPH/DIAG INJ IV PUSH: CPT

## 2024-12-06 PROCEDURE — 81001 URINALYSIS AUTO W/SCOPE: CPT

## 2024-12-06 RX ADMIN — WATER 1000 MG: 1 INJECTION INTRAMUSCULAR; INTRAVENOUS; SUBCUTANEOUS at 18:59

## 2024-12-06 NOTE — ED PROVIDER NOTES
[facility] EMERGENCY DEPARTMENT  EMERGENCY DEPARTMENT ENCOUNTER        Pt Name: Young Watkins  MRN: [id number]  Birthdate 12/4/1930  Date of evaluation: 12/6/2024  Provider: Lakisha Franz DO  PCP: Shon Lynch MD  Note Started: 3:47 PM EST 12/6/24    CHIEF COMPLAINT       Chief Complaint   Patient presents with    Hematuria     Had procedure done at [facility] a couple day ago to get rid of clots per EMS and the nurse at [facility] noticed blood in his catheter today        HISTORY OF PRESENT ILLNESS: 1 or more Elements   Young Watkins is a 89 y.o. male who presents to the emergency department with chief complaint of one day of blood clots in his way catheter.  Patient had a procedure done at [facility] a few days ago and had a way catheter placed.  Per daughter later at bedside, patient had a cystoscopy performed for evaluation of a potential mass.  In the ED, patient has no complaints.  He states that he had many clots coming out of his way catheter yesterday but has not noticed as many now.  He otherwise denies fevers, chills, nausea, vomiting, CP, SOB, or weakness.    Nursing Notes were all reviewed and agreed with or any disagreements were addressed in the HPI.    REVIEW OF SYSTEMS :    Positives and Pertinent negatives as per HPI.    PAST MEDICAL HISTORY/Chronic Conditions Affecting Care    has no past medical history on file.     SURGICAL HISTORY   No past surgical history on file.    CURRENTMEDICATIONS       Previous Medications    APIXABAN (ELIQUIS) 2.5 MG TABS TABLET    Take 1 tablet by mouth 2 times daily    CYANOCOBALAMIN 1000 MCG TABLET    Take 0.5 tablets by mouth daily    DOCUSATE SODIUM (COLACE) 100 MG CAPSULE    Take 2 capsules by mouth at bedtime    EMPAGLIFLOZIN (JARDIANCE) 10 MG TABLET    Take 1 tablet by mouth daily    FAMOTIDINE (PEPCID) 20 MG TABLET    Take 1 tablet by mouth daily    FERROUS SULFATE (IRON 325) 325 (65 FE) MG TABLET    Take 1  patient and son at bedside.  Son reports that patient is currently taking Keflex for UTI symptoms.  Discussed plan for discharge back home to the facility.  Patient is given strict return precautions for the emergency department.  He is instructed to follow-up with his urologist.  There is no need for the Bingham catheter to be replaced at this time as it is functioning properly.  Patient is to be discharged home in stable condition.    Please refer to the ED Course as available for additional MDM.  ED Course as of 12/07/24 0021   Fri Dec 06, 2024   [address] [facility] urologist Dr. Daryn Sandhu.  [KG]   2040 Bingham catheter able to be flushed and urine is noted to be light pink rather than dark red.  [CW]      ED Course User Index  [CW] Lakisha Franz DO  [KG] Marjorie Hughes DO        Social Determinants affecting Dx or Tx: Patient has good medical compliance and fluency as well as establish follow-up with family physician.    Records Reviewed: Nursing home records sent with patient    I am the Primary Clinician of Record.    CONSULTS: (Who and What was discussed)  None    FINAL IMPRESSION      1. Urinary tract infection with hematuria, site unspecified    2. Hematuria, unspecified type          DISPOSITION/PLAN   DISPOSITION Decision To Discharge 12/06/2024 08:42:45 PM    PATIENT REFERRED TO:  Shon Lynch MD  [address]  [facility] [address] [phone]      As needed, If symptoms worsen    Daryn Sandhu MD  [address] [facility] [address] [phone]    Call in 3 days        DISCHARGE MEDICATIONS:  New Prescriptions    No medications on file            (Please note that portions of this note were completed with a voice recognition program.  Efforts were made to edit the dictations but occasionally words are mis-transcribed.)    Lakisha Franz DO (electronically signed)

## 2024-12-07 VITALS
HEART RATE: 82 BPM | TEMPERATURE: 98.1 F | BODY MASS INDEX: 26.58 KG/M2 | WEIGHT: 180 LBS | OXYGEN SATURATION: 97 % | RESPIRATION RATE: 23 BRPM | SYSTOLIC BLOOD PRESSURE: 93 MMHG | DIASTOLIC BLOOD PRESSURE: 68 MMHG

## 2024-12-07 LAB
MICROORGANISM SPEC CULT: NO GROWTH
SERVICE CMNT-IMP: NORMAL
SPECIMEN DESCRIPTION: NORMAL

## 2024-12-15 ENCOUNTER — APPOINTMENT (OUTPATIENT)
Dept: CT IMAGING | Age: 89
DRG: 291 | End: 2024-12-15
Payer: MEDICARE

## 2024-12-15 ENCOUNTER — HOSPITAL ENCOUNTER (INPATIENT)
Age: 89
LOS: 8 days | Discharge: NURSING, REHAB OR LONG-TERM CARE | DRG: 291 | End: 2024-12-24
Attending: EMERGENCY MEDICINE | Admitting: STUDENT IN AN ORGANIZED HEALTH CARE EDUCATION/TRAINING PROGRAM
Payer: MEDICARE

## 2024-12-15 ENCOUNTER — APPOINTMENT (OUTPATIENT)
Dept: GENERAL RADIOLOGY | Age: 89
DRG: 291 | End: 2024-12-15
Payer: MEDICARE

## 2024-12-15 DIAGNOSIS — N30.00 ACUTE CYSTITIS WITHOUT HEMATURIA: ICD-10-CM

## 2024-12-15 DIAGNOSIS — E87.5 HYPERKALEMIA: ICD-10-CM

## 2024-12-15 DIAGNOSIS — N18.9 CHRONIC KIDNEY DISEASE, UNSPECIFIED CKD STAGE: ICD-10-CM

## 2024-12-15 DIAGNOSIS — I50.23 ACUTE ON CHRONIC SYSTOLIC CONGESTIVE HEART FAILURE (HCC): ICD-10-CM

## 2024-12-15 DIAGNOSIS — I50.9 ACUTE ON CHRONIC CONGESTIVE HEART FAILURE, UNSPECIFIED HEART FAILURE TYPE (HCC): ICD-10-CM

## 2024-12-15 DIAGNOSIS — J90 PLEURAL EFFUSION ON LEFT: Primary | ICD-10-CM

## 2024-12-15 LAB
ALBUMIN SERPL-MCNC: 3.4 G/DL (ref 3.5–5.2)
ALP SERPL-CCNC: 117 U/L (ref 40–129)
ALT SERPL-CCNC: 15 U/L (ref 0–40)
ANION GAP SERPL CALCULATED.3IONS-SCNC: 9 MMOL/L (ref 7–16)
AST SERPL-CCNC: 29 U/L (ref 0–39)
B PARAP IS1001 DNA NPH QL NAA+NON-PROBE: NOT DETECTED
B PERT DNA SPEC QL NAA+PROBE: NOT DETECTED
BACTERIA URNS QL MICRO: ABNORMAL
BASOPHILS # BLD: 0 K/UL (ref 0–0.2)
BASOPHILS NFR BLD: 0 % (ref 0–2)
BILIRUB SERPL-MCNC: 0.6 MG/DL (ref 0–1.2)
BILIRUB UR QL STRIP: NEGATIVE
BNP SERPL-MCNC: 8362 PG/ML (ref 0–450)
BUN SERPL-MCNC: 32 MG/DL (ref 6–23)
C PNEUM DNA NPH QL NAA+NON-PROBE: NOT DETECTED
CALCIUM SERPL-MCNC: 8.6 MG/DL (ref 8.6–10.2)
CHLORIDE SERPL-SCNC: 100 MMOL/L (ref 98–107)
CLARITY UR: ABNORMAL
CO2 SERPL-SCNC: 27 MMOL/L (ref 22–29)
COLOR UR: ABNORMAL
CREAT SERPL-MCNC: 2 MG/DL (ref 0.7–1.2)
EOSINOPHIL # BLD: 0 K/UL (ref 0.05–0.5)
EOSINOPHILS RELATIVE PERCENT: 0 % (ref 0–6)
ERYTHROCYTE [DISTWIDTH] IN BLOOD BY AUTOMATED COUNT: 15.5 % (ref 11.5–15)
FLUAV RNA NPH QL NAA+NON-PROBE: NOT DETECTED
FLUBV RNA NPH QL NAA+NON-PROBE: NOT DETECTED
GFR, ESTIMATED: 31 ML/MIN/1.73M2
GLUCOSE SERPL-MCNC: 130 MG/DL (ref 74–99)
GLUCOSE UR STRIP-MCNC: >=1000 MG/DL
HADV DNA NPH QL NAA+NON-PROBE: NOT DETECTED
HCOV 229E RNA NPH QL NAA+NON-PROBE: NOT DETECTED
HCOV HKU1 RNA NPH QL NAA+NON-PROBE: NOT DETECTED
HCOV NL63 RNA NPH QL NAA+NON-PROBE: NOT DETECTED
HCOV OC43 RNA NPH QL NAA+NON-PROBE: NOT DETECTED
HCT VFR BLD AUTO: 34.7 % (ref 37–54)
HGB BLD-MCNC: 10.5 G/DL (ref 12.5–16.5)
HGB UR QL STRIP.AUTO: ABNORMAL
HMPV RNA NPH QL NAA+NON-PROBE: NOT DETECTED
HPIV1 RNA NPH QL NAA+NON-PROBE: NOT DETECTED
HPIV2 RNA NPH QL NAA+NON-PROBE: NOT DETECTED
HPIV3 RNA NPH QL NAA+NON-PROBE: NOT DETECTED
HPIV4 RNA NPH QL NAA+NON-PROBE: NOT DETECTED
INFLUENZA A BY PCR: NOT DETECTED
INFLUENZA B BY PCR: NOT DETECTED
KETONES UR STRIP-MCNC: 15 MG/DL
LACTATE BLDV-SCNC: 1.7 MMOL/L (ref 0.5–1.9)
LEUKOCYTE ESTERASE UR QL STRIP: ABNORMAL
LIPASE SERPL-CCNC: 32 U/L (ref 13–60)
LYMPHOCYTES NFR BLD: 0.47 K/UL (ref 1.5–4)
LYMPHOCYTES RELATIVE PERCENT: 4 % (ref 20–42)
M PNEUMO DNA NPH QL NAA+NON-PROBE: NOT DETECTED
MCH RBC QN AUTO: 30 PG (ref 26–35)
MCHC RBC AUTO-ENTMCNC: 30.3 G/DL (ref 32–34.5)
MCV RBC AUTO: 99.1 FL (ref 80–99.9)
MONOCYTES NFR BLD: 0.94 K/UL (ref 0.1–0.95)
MONOCYTES NFR BLD: 9 % (ref 2–12)
NEUTROPHILS NFR BLD: 87 % (ref 43–80)
NEUTS SEG NFR BLD: 9.39 K/UL (ref 1.8–7.3)
NITRITE UR QL STRIP: POSITIVE
PH UR STRIP: 6.5 [PH] (ref 5–9)
PLATELET # BLD AUTO: 154 K/UL (ref 130–450)
PMV BLD AUTO: 10.8 FL (ref 7–12)
POTASSIUM SERPL-SCNC: 5.3 MMOL/L (ref 3.5–5)
PROT SERPL-MCNC: 6.7 G/DL (ref 6.4–8.3)
PROT UR STRIP-MCNC: >=300 MG/DL
RBC # BLD AUTO: 3.5 M/UL (ref 3.8–5.8)
RBC # BLD: ABNORMAL 10*6/UL
RBC #/AREA URNS HPF: ABNORMAL /HPF
RSV RNA NPH QL NAA+NON-PROBE: NOT DETECTED
RV+EV RNA NPH QL NAA+NON-PROBE: NOT DETECTED
SARS-COV-2 RDRP RESP QL NAA+PROBE: NOT DETECTED
SARS-COV-2 RNA NPH QL NAA+NON-PROBE: NOT DETECTED
SODIUM SERPL-SCNC: 136 MMOL/L (ref 132–146)
SP GR UR STRIP: 1.01 (ref 1–1.03)
SPECIMEN DESCRIPTION: NORMAL
SPECIMEN DESCRIPTION: NORMAL
TROPONIN I SERPL HS-MCNC: 102 NG/L (ref 0–11)
TROPONIN I SERPL HS-MCNC: 92 NG/L (ref 0–11)
UROBILINOGEN UR STRIP-ACNC: 1 EU/DL (ref 0–1)
WBC #/AREA URNS HPF: ABNORMAL /HPF
WBC OTHER # BLD: 10.8 K/UL (ref 4.5–11.5)

## 2024-12-15 PROCEDURE — 81001 URINALYSIS AUTO W/SCOPE: CPT

## 2024-12-15 PROCEDURE — 87086 URINE CULTURE/COLONY COUNT: CPT

## 2024-12-15 PROCEDURE — 0202U NFCT DS 22 TRGT SARS-COV-2: CPT

## 2024-12-15 PROCEDURE — 87635 SARS-COV-2 COVID-19 AMP PRB: CPT

## 2024-12-15 PROCEDURE — 93005 ELECTROCARDIOGRAM TRACING: CPT | Performed by: EMERGENCY MEDICINE

## 2024-12-15 PROCEDURE — 71250 CT THORAX DX C-: CPT

## 2024-12-15 PROCEDURE — 87502 INFLUENZA DNA AMP PROBE: CPT

## 2024-12-15 PROCEDURE — 83880 ASSAY OF NATRIURETIC PEPTIDE: CPT

## 2024-12-15 PROCEDURE — 71045 X-RAY EXAM CHEST 1 VIEW: CPT

## 2024-12-15 PROCEDURE — 99285 EMERGENCY DEPT VISIT HI MDM: CPT

## 2024-12-15 PROCEDURE — 96375 TX/PRO/DX INJ NEW DRUG ADDON: CPT

## 2024-12-15 PROCEDURE — 84484 ASSAY OF TROPONIN QUANT: CPT

## 2024-12-15 PROCEDURE — 85025 COMPLETE CBC W/AUTO DIFF WBC: CPT

## 2024-12-15 PROCEDURE — 83690 ASSAY OF LIPASE: CPT

## 2024-12-15 PROCEDURE — 83605 ASSAY OF LACTIC ACID: CPT

## 2024-12-15 PROCEDURE — 6360000002 HC RX W HCPCS: Performed by: EMERGENCY MEDICINE

## 2024-12-15 PROCEDURE — 2580000003 HC RX 258: Performed by: EMERGENCY MEDICINE

## 2024-12-15 PROCEDURE — 87040 BLOOD CULTURE FOR BACTERIA: CPT

## 2024-12-15 PROCEDURE — 80053 COMPREHEN METABOLIC PANEL: CPT

## 2024-12-15 PROCEDURE — 96374 THER/PROPH/DIAG INJ IV PUSH: CPT

## 2024-12-15 RX ORDER — 0.9 % SODIUM CHLORIDE 0.9 %
500 INTRAVENOUS SOLUTION INTRAVENOUS ONCE
Status: COMPLETED | OUTPATIENT
Start: 2024-12-15 | End: 2024-12-15

## 2024-12-15 RX ORDER — FUROSEMIDE 10 MG/ML
40 INJECTION INTRAMUSCULAR; INTRAVENOUS ONCE
Status: COMPLETED | OUTPATIENT
Start: 2024-12-15 | End: 2024-12-15

## 2024-12-15 RX ADMIN — WATER 1000 MG: 1 INJECTION INTRAMUSCULAR; INTRAVENOUS; SUBCUTANEOUS at 21:02

## 2024-12-15 RX ADMIN — FUROSEMIDE 40 MG: 10 INJECTION, SOLUTION INTRAMUSCULAR; INTRAVENOUS at 21:00

## 2024-12-15 RX ADMIN — SODIUM CHLORIDE 500 ML: 9 INJECTION, SOLUTION INTRAVENOUS at 17:50

## 2024-12-15 ASSESSMENT — PAIN - FUNCTIONAL ASSESSMENT: PAIN_FUNCTIONAL_ASSESSMENT: NONE - DENIES PAIN

## 2024-12-16 ENCOUNTER — APPOINTMENT (OUTPATIENT)
Dept: ULTRASOUND IMAGING | Age: 89
DRG: 291 | End: 2024-12-16
Payer: MEDICARE

## 2024-12-16 ENCOUNTER — APPOINTMENT (OUTPATIENT)
Dept: GENERAL RADIOLOGY | Age: 89
DRG: 291 | End: 2024-12-16
Payer: MEDICARE

## 2024-12-16 PROBLEM — J90 PLEURAL EFFUSION: Status: ACTIVE | Noted: 2024-12-16

## 2024-12-16 PROBLEM — N30.01 ACUTE CYSTITIS WITH HEMATURIA: Status: ACTIVE | Noted: 2024-12-16

## 2024-12-16 LAB
ANION GAP SERPL CALCULATED.3IONS-SCNC: 7 MMOL/L (ref 7–16)
APPEARANCE FLD: NORMAL
BODY FLD TYPE: NORMAL
BUN SERPL-MCNC: 37 MG/DL (ref 6–23)
CALCIUM SERPL-MCNC: 8.3 MG/DL (ref 8.6–10.2)
CHLORIDE SERPL-SCNC: 100 MMOL/L (ref 98–107)
CHOLEST SERPL-MCNC: 87 MG/DL
CHOLESTEROL FLUID: 24 MG/DL
CLOT CHECK: NORMAL
CO2 SERPL-SCNC: 28 MMOL/L (ref 22–29)
COLOR FLD: YELLOW
CREAT SERPL-MCNC: 2.1 MG/DL (ref 0.7–1.2)
CRITICAL: NORMAL
DATE ANALYZED: NORMAL
DATE OF COLLECTION: NORMAL
FERRITIN SERPL-MCNC: 335 NG/ML
GFR, ESTIMATED: 29 ML/MIN/1.73M2
GLUCOSE FLD-MCNC: 146 MG/DL
GLUCOSE SERPL-MCNC: 129 MG/DL (ref 74–99)
HDLC SERPL-MCNC: 51 MG/DL
INR PPP: 1.4
IRON SATN MFR SERPL: 14 % (ref 20–55)
IRON SERPL-MCNC: 34 UG/DL (ref 59–158)
LAB: NORMAL
LDH FLD L TO P-CCNC: 91 U/L
LDH SERPL-CCNC: 346 U/L (ref 135–225)
LDLC SERPL CALC-MCNC: 27 MG/DL
Lab: 1400
MAGNESIUM SERPL-MCNC: 2.9 MG/DL (ref 1.6–2.6)
MONOCYTES NFR FLD: 25 %
NEUTROPHILS NFR FLD: 75 %
OPERATOR ID: 1023
PH FLUID: 7.48
POTASSIUM SERPL-SCNC: 5.6 MMOL/L (ref 3.5–5)
PROT FLD-MCNC: 2.6 G/DL
PROTHROMBIN TIME: 14.6 SEC (ref 9.3–12.4)
RBC # FLD: 6000 CELLS/UL
SODIUM SERPL-SCNC: 135 MMOL/L (ref 132–146)
SOURCE, BLOOD GAS: NORMAL
SPECIMEN TYPE: NORMAL
TIBC SERPL-MCNC: 246 UG/DL (ref 250–450)
TIME ANALYZED: 1425
TRIGL SERPL-MCNC: 44 MG/DL
TSH SERPL DL<=0.05 MIU/L-ACNC: 1.96 UIU/ML (ref 0.27–4.2)
VLDLC SERPL CALC-MCNC: 9 MG/DL
WBC # FLD: 3577 CELLS/UL

## 2024-12-16 PROCEDURE — 83615 LACTATE (LD) (LDH) ENZYME: CPT

## 2024-12-16 PROCEDURE — 88305 TISSUE EXAM BY PATHOLOGIST: CPT

## 2024-12-16 PROCEDURE — 99223 1ST HOSP IP/OBS HIGH 75: CPT | Performed by: STUDENT IN AN ORGANIZED HEALTH CARE EDUCATION/TRAINING PROGRAM

## 2024-12-16 PROCEDURE — 83986 ASSAY PH BODY FLUID NOS: CPT

## 2024-12-16 PROCEDURE — 2580000003 HC RX 258

## 2024-12-16 PROCEDURE — 87205 SMEAR GRAM STAIN: CPT

## 2024-12-16 PROCEDURE — 89051 BODY FLUID CELL COUNT: CPT

## 2024-12-16 PROCEDURE — 2060000000 HC ICU INTERMEDIATE R&B

## 2024-12-16 PROCEDURE — 6360000002 HC RX W HCPCS: Performed by: PHYSICIAN ASSISTANT

## 2024-12-16 PROCEDURE — 82728 ASSAY OF FERRITIN: CPT

## 2024-12-16 PROCEDURE — 6360000002 HC RX W HCPCS: Performed by: INTERNAL MEDICINE

## 2024-12-16 PROCEDURE — 83735 ASSAY OF MAGNESIUM: CPT

## 2024-12-16 PROCEDURE — 83550 IRON BINDING TEST: CPT

## 2024-12-16 PROCEDURE — 6360000002 HC RX W HCPCS

## 2024-12-16 PROCEDURE — 80048 BASIC METABOLIC PNL TOTAL CA: CPT

## 2024-12-16 PROCEDURE — 80061 LIPID PANEL: CPT

## 2024-12-16 PROCEDURE — 71045 X-RAY EXAM CHEST 1 VIEW: CPT

## 2024-12-16 PROCEDURE — 85610 PROTHROMBIN TIME: CPT

## 2024-12-16 PROCEDURE — 84157 ASSAY OF PROTEIN OTHER: CPT

## 2024-12-16 PROCEDURE — 93005 ELECTROCARDIOGRAM TRACING: CPT | Performed by: EMERGENCY MEDICINE

## 2024-12-16 PROCEDURE — 87070 CULTURE OTHR SPECIMN AEROBIC: CPT

## 2024-12-16 PROCEDURE — APPSS45 APP SPLIT SHARED TIME 31-45 MINUTES

## 2024-12-16 PROCEDURE — 83540 ASSAY OF IRON: CPT

## 2024-12-16 PROCEDURE — 6370000000 HC RX 637 (ALT 250 FOR IP): Performed by: INTERNAL MEDICINE

## 2024-12-16 PROCEDURE — 82465 ASSAY BLD/SERUM CHOLESTEROL: CPT

## 2024-12-16 PROCEDURE — C1729 CATH, DRAINAGE: HCPCS

## 2024-12-16 PROCEDURE — 6370000000 HC RX 637 (ALT 250 FOR IP)

## 2024-12-16 PROCEDURE — 88112 CYTOPATH CELL ENHANCE TECH: CPT

## 2024-12-16 PROCEDURE — 0W9B3ZZ DRAINAGE OF LEFT PLEURAL CAVITY, PERCUTANEOUS APPROACH: ICD-10-PCS | Performed by: STUDENT IN AN ORGANIZED HEALTH CARE EDUCATION/TRAINING PROGRAM

## 2024-12-16 PROCEDURE — 84443 ASSAY THYROID STIM HORMONE: CPT

## 2024-12-16 PROCEDURE — 82945 GLUCOSE OTHER FLUID: CPT

## 2024-12-16 RX ORDER — PRAVASTATIN SODIUM 20 MG
40 TABLET ORAL NIGHTLY
Status: DISCONTINUED | OUTPATIENT
Start: 2024-12-16 | End: 2024-12-24 | Stop reason: HOSPADM

## 2024-12-16 RX ORDER — FOLIC ACID 1 MG/1
1 TABLET ORAL DAILY
Status: DISCONTINUED | OUTPATIENT
Start: 2024-12-16 | End: 2024-12-24 | Stop reason: HOSPADM

## 2024-12-16 RX ORDER — SODIUM CHLORIDE 0.9 % (FLUSH) 0.9 %
5-40 SYRINGE (ML) INJECTION EVERY 12 HOURS SCHEDULED
Status: DISCONTINUED | OUTPATIENT
Start: 2024-12-16 | End: 2024-12-24 | Stop reason: HOSPADM

## 2024-12-16 RX ORDER — ACETAMINOPHEN 650 MG/1
650 SUPPOSITORY RECTAL EVERY 6 HOURS PRN
Status: DISCONTINUED | OUTPATIENT
Start: 2024-12-16 | End: 2024-12-24 | Stop reason: HOSPADM

## 2024-12-16 RX ORDER — CALCIUM CARBONATE 500 MG/1
50 TABLET, CHEWABLE ORAL EVERY 8 HOURS PRN
COMMUNITY

## 2024-12-16 RX ORDER — CHLORHEXIDINE GLUCONATE 40 MG/ML
1 SOLUTION TOPICAL SEE ADMIN INSTRUCTIONS
COMMUNITY

## 2024-12-16 RX ORDER — TAMSULOSIN HYDROCHLORIDE 0.4 MG/1
0.4 CAPSULE ORAL NIGHTLY
COMMUNITY

## 2024-12-16 RX ORDER — ACETAMINOPHEN 325 MG/1
650 TABLET ORAL EVERY 6 HOURS PRN
COMMUNITY

## 2024-12-16 RX ORDER — SENNOSIDES A AND B 8.6 MG/1
2 TABLET, FILM COATED ORAL NIGHTLY PRN
COMMUNITY

## 2024-12-16 RX ORDER — SODIUM CHLORIDE 0.9 % (FLUSH) 0.9 %
5-40 SYRINGE (ML) INJECTION PRN
Status: DISCONTINUED | OUTPATIENT
Start: 2024-12-16 | End: 2024-12-24 | Stop reason: HOSPADM

## 2024-12-16 RX ORDER — DOCUSATE SODIUM 100 MG/1
200 CAPSULE, LIQUID FILLED ORAL NIGHTLY
Status: DISCONTINUED | OUTPATIENT
Start: 2024-12-16 | End: 2024-12-24 | Stop reason: HOSPADM

## 2024-12-16 RX ORDER — ACETAMINOPHEN 325 MG/1
650 TABLET ORAL EVERY 6 HOURS PRN
Status: DISCONTINUED | OUTPATIENT
Start: 2024-12-16 | End: 2024-12-24 | Stop reason: HOSPADM

## 2024-12-16 RX ORDER — FERROUS SULFATE 325(65) MG
325 TABLET ORAL
Status: DISCONTINUED | OUTPATIENT
Start: 2024-12-16 | End: 2024-12-24 | Stop reason: HOSPADM

## 2024-12-16 RX ORDER — FUROSEMIDE 10 MG/ML
40 INJECTION INTRAMUSCULAR; INTRAVENOUS 2 TIMES DAILY
Status: DISCONTINUED | OUTPATIENT
Start: 2024-12-16 | End: 2024-12-21

## 2024-12-16 RX ORDER — METOPROLOL TARTRATE 25 MG/1
12.5 TABLET, FILM COATED ORAL 2 TIMES DAILY
Status: DISCONTINUED | OUTPATIENT
Start: 2024-12-16 | End: 2024-12-17

## 2024-12-16 RX ORDER — POLYETHYLENE GLYCOL 3350 17 G/17G
17 POWDER, FOR SOLUTION ORAL DAILY PRN
Status: DISCONTINUED | OUTPATIENT
Start: 2024-12-16 | End: 2024-12-24 | Stop reason: HOSPADM

## 2024-12-16 RX ORDER — CALCIUM GLUCONATE 94 MG/ML
1000 INJECTION, SOLUTION INTRAVENOUS ONCE
Status: DISCONTINUED | OUTPATIENT
Start: 2024-12-16 | End: 2024-12-16 | Stop reason: SDUPTHER

## 2024-12-16 RX ORDER — BISACODYL 10 MG
10 SUPPOSITORY, RECTAL RECTAL DAILY PRN
COMMUNITY

## 2024-12-16 RX ORDER — MINERAL OIL, PETROLATUM, PHENYLEPHRINE HCL 2.5; 140; 749 MG/G; MG/G; MG/G
1 OINTMENT TOPICAL EVERY 12 HOURS PRN
COMMUNITY

## 2024-12-16 RX ORDER — METOPROLOL SUCCINATE 25 MG/1
25 TABLET, EXTENDED RELEASE ORAL EVERY MORNING
Status: ON HOLD | COMMUNITY
End: 2024-12-24 | Stop reason: HOSPADM

## 2024-12-16 RX ORDER — FAMOTIDINE 20 MG/1
20 TABLET, FILM COATED ORAL DAILY
Status: DISCONTINUED | OUTPATIENT
Start: 2024-12-16 | End: 2024-12-24 | Stop reason: HOSPADM

## 2024-12-16 RX ORDER — AMIODARONE HYDROCHLORIDE 200 MG/1
200 TABLET ORAL 2 TIMES DAILY
Status: ON HOLD | COMMUNITY
End: 2024-12-24 | Stop reason: HOSPADM

## 2024-12-16 RX ORDER — LIDOCAINE HYDROCHLORIDE 10 MG/ML
INJECTION, SOLUTION EPIDURAL; INFILTRATION; INTRACAUDAL; PERINEURAL PRN
Status: COMPLETED | OUTPATIENT
Start: 2024-12-16 | End: 2024-12-16

## 2024-12-16 RX ORDER — BUMETANIDE 1 MG/1
1 TABLET ORAL 2 TIMES DAILY
Status: ON HOLD | COMMUNITY
End: 2024-12-24 | Stop reason: HOSPADM

## 2024-12-16 RX ORDER — POTASSIUM CHLORIDE 1500 MG/1
20 TABLET, EXTENDED RELEASE ORAL EVERY MORNING
COMMUNITY

## 2024-12-16 RX ORDER — MIRTAZAPINE 15 MG/1
15 TABLET, FILM COATED ORAL NIGHTLY
Status: DISCONTINUED | OUTPATIENT
Start: 2024-12-16 | End: 2024-12-24 | Stop reason: HOSPADM

## 2024-12-16 RX ORDER — SODIUM CHLORIDE 9 MG/ML
INJECTION, SOLUTION INTRAVENOUS PRN
Status: DISCONTINUED | OUTPATIENT
Start: 2024-12-16 | End: 2024-12-24 | Stop reason: HOSPADM

## 2024-12-16 RX ORDER — LANOLIN ALCOHOL/MO/W.PET/CERES
500 CREAM (GRAM) TOPICAL DAILY
Status: DISCONTINUED | OUTPATIENT
Start: 2024-12-16 | End: 2024-12-24 | Stop reason: HOSPADM

## 2024-12-16 RX ORDER — CALCIUM GLUCONATE 20 MG/ML
1000 INJECTION, SOLUTION INTRAVENOUS ONCE
Status: COMPLETED | OUTPATIENT
Start: 2024-12-16 | End: 2024-12-16

## 2024-12-16 RX ORDER — VITAMIN B COMPLEX
1000 TABLET ORAL DAILY
Status: DISCONTINUED | OUTPATIENT
Start: 2024-12-16 | End: 2024-12-24 | Stop reason: HOSPADM

## 2024-12-16 RX ADMIN — PRAVASTATIN SODIUM 40 MG: 20 TABLET ORAL at 20:40

## 2024-12-16 RX ADMIN — LIDOCAINE HYDROCHLORIDE 10 ML: 10 INJECTION, SOLUTION EPIDURAL; INFILTRATION; INTRACAUDAL; PERINEURAL at 14:07

## 2024-12-16 RX ADMIN — MIRTAZAPINE 15 MG: 15 TABLET, FILM COATED ORAL at 20:40

## 2024-12-16 RX ADMIN — DOCUSATE SODIUM 200 MG: 100 CAPSULE, LIQUID FILLED ORAL at 20:40

## 2024-12-16 RX ADMIN — FERROUS SULFATE TAB 325 MG (65 MG ELEMENTAL FE) 325 MG: 325 (65 FE) TAB at 07:56

## 2024-12-16 RX ADMIN — CALCIUM GLUCONATE 1000 MG: 20 INJECTION, SOLUTION INTRAVENOUS at 20:04

## 2024-12-16 RX ADMIN — SODIUM CHLORIDE, PRESERVATIVE FREE 10 ML: 5 INJECTION INTRAVENOUS at 20:41

## 2024-12-16 RX ADMIN — FUROSEMIDE 40 MG: 10 INJECTION, SOLUTION INTRAMUSCULAR; INTRAVENOUS at 07:56

## 2024-12-16 RX ADMIN — Medication 1000 UNITS: at 20:42

## 2024-12-16 RX ADMIN — SODIUM CHLORIDE, PRESERVATIVE FREE 10 ML: 5 INJECTION INTRAVENOUS at 07:57

## 2024-12-16 RX ADMIN — CYANOCOBALAMIN TAB 1000 MCG 500 MCG: 1000 TAB at 07:56

## 2024-12-16 RX ADMIN — SODIUM ZIRCONIUM CYCLOSILICATE 10 G: 10 POWDER, FOR SUSPENSION ORAL at 20:40

## 2024-12-16 RX ADMIN — ACETAMINOPHEN 650 MG: 325 TABLET ORAL at 16:29

## 2024-12-16 RX ADMIN — FOLIC ACID 1 MG: 1 TABLET ORAL at 07:57

## 2024-12-16 RX ADMIN — FAMOTIDINE 20 MG: 20 TABLET ORAL at 20:42

## 2024-12-16 ASSESSMENT — PAIN DESCRIPTION - LOCATION
LOCATION: BACK
LOCATION: BACK

## 2024-12-16 ASSESSMENT — PAIN DESCRIPTION - DESCRIPTORS
DESCRIPTORS: ACHING
DESCRIPTORS: ACHING

## 2024-12-16 ASSESSMENT — PAIN DESCRIPTION - ORIENTATION
ORIENTATION: LEFT
ORIENTATION: LEFT

## 2024-12-16 ASSESSMENT — PAIN SCALES - GENERAL
PAINLEVEL_OUTOF10: 3
PAINLEVEL_OUTOF10: 7
PAINLEVEL_OUTOF10: 0

## 2024-12-16 NOTE — ED PROVIDER NOTES
12:32 AM EST  I received this patient at sign out from Dr. toribio  I have discussed the patient's initial exam, treatment and plan of care with the out going physician.  I have introduced my self to the patient / family and have answered their questions to this point.  I have examined the patient myself and reviewed ordered tests / medications and  reviewed any available results to this point.  If a resident is involved in the Emergency Department care, I have discussed my findings and plan with them as well.      Patient admitted to [facility]  Patient stable given IV lasix        ED Course as of 12/16/24 1700   Sun Dec 15, 2024   2103 Patient resting comfortably no chest pain no increased work of breathing.  Son is at bedside he is comfortable plan for admission.  Awaiting second troponin.  Signed out to Dr. Stock. [KK]      ED Course User Index  [KK] Arminda Toribio MD       --------------------------------------------- PAST HISTORY ---------------------------------------------  Past Medical History:  has no past medical history on file.    Past Surgical History:  has no past surgical history on file.    Social History:      Family History: family history is not on file.     The patient’s home medications have been reviewed.    Allergies: Axid [nizatidine], Indocin [indomethacin], Klor-con [potassium chloride], Levaquin [levofloxacin], and Ranexa [ranolazine]    -------------------------------------------------- RESULTS -------------------------------------------------    LABS:  Results for orders placed or performed during the hospital encounter of 12/15/24   COVID-19, Rapid    Specimen: Nasopharyngeal Swab   Result Value Ref Range    Specimen Description .NASOPHARYNGEAL SWAB     SARS-CoV-2, Rapid Not Detected Not Detected   Influenza A+B, PCR    Specimen: Nasal   Result Value Ref Range    Influenza A by PCR Not Detected Not Detected    Influenza B by PCR Not Detected Not Detected   Respiratory Panel, Molecular, 
distress  Cardiovascular:  Regular rate. Regular rhythm. No murmurs, no gallops, no rubs. 2+ distal pulses. Equal extremity pulses.   Chest: No chest wall tenderness  GI:  Abdomen Soft, Non tender, Non distended.  No rebound, guarding, or rigidity. No pulsatile masses.  Musculoskeletal: Moves all extremities x 4. Warm and well perfused, no clubbing, no cyanosis, no edema. Capillary refill <3 seconds  Integument: skin warm and dry. No rashes.   Neurologic: GCS 15, no focal deficits, symmetric strength 5/5 in the upper and lower extremities bilaterally  Psychiatric: Normal Affect            DIAGNOSTIC RESULTS   LABS:      I have personally reviewed and interpreted all laboratory and imaging results for this patient. Results are listed below.     LABS:  Results for orders placed or performed during the hospital encounter of 12/15/24   COVID-19, Rapid    Specimen: Nasopharyngeal Swab   Result Value Ref Range    Specimen Description .NASOPHARYNGEAL SWAB     SARS-CoV-2, Rapid Not Detected Not Detected   Influenza A+B, PCR    Specimen: Nasal   Result Value Ref Range    Influenza A by PCR Not Detected Not Detected    Influenza B by PCR Not Detected Not Detected   Culture, Blood 1    Specimen: Blood   Result Value Ref Range    Specimen Description .BLOOD     Special Requests          Culture NO GROWTH <24 HRS    Culture, Blood 2    Specimen: Blood   Result Value Ref Range    Specimen Description .BLOOD     Special Requests          Culture NO GROWTH <24 HRS    CBC with Auto Differential   Result Value Ref Range    WBC 10.8 4.5 - 11.5 k/uL    RBC 3.50 (L) 3.80 - 5.80 m/uL    Hemoglobin 10.5 (L) 12.5 - 16.5 g/dL    Hematocrit 34.7 (L) 37.0 - 54.0 %    MCV 99.1 80.0 - 99.9 fL    MCH 30.0 26.0 - 35.0 pg    MCHC 30.3 (L) 32.0 - 34.5 g/dL    RDW 15.5 (H) 11.5 - 15.0 %    Platelets 154 130 - 450 k/uL    MPV 10.8 7.0 - 12.0 fL    Neutrophils % 87 (H) 43.0 - 80.0 %    Lymphocytes % 4 (L) 20.0 - 42.0 %    Monocytes % 9 2.0 - 12.0 %

## 2024-12-16 NOTE — ED NOTES
Pt has blister on back of right heel, complains of pain when heel is resting on bed. RN has pillow under right calf to raise heel off of bed. Provider notified.

## 2024-12-16 NOTE — H&P
[facility] History and Physical      CHIEF COMPLAINT: Shortness of breath    History of Present Illness:  This is a 89-year-old male with a past medical history of CHF, atrial fibrillation on Eliquis, radiation cystitis with chronic Bingham catheter, and chronic hematuria who presents to the ED with shortness of breath.  Patient states he had some back pain that radiated into his chest and down both his arms.  Denies any current pain.  Per ED note, there was a reported fever at the facility today, patient stated he was having chills, he is unaware of fever.  States he sleeps in a lounge chair at night and he is unsure if he has orthopnea.  Denies abdominal pain, nausea, vomiting, diarrhea.  Workup in ED was notable for a moderate/large left pleural effusion and trace right pleural effusion, cardiomegaly with small pericardial effusion, and pulmonary hypertension.  Lab work was notable for potassium of 5.3, proBNP 8362, troponin 102, 92.  Treated with ceftriaxone and 40 mg IV Lasix.  Decision to admit.    Informant(s) for H&P: Patient and chart review    REVIEW OF SYSTEMS:  A comprehensive review of systems was negative except for: what is in the HPI      PMH:  No past medical history on file.    Surgical History:  No past surgical history on file.    Medications Prior to Admission:    Prior to Admission medications    Medication Sig Start Date End Date Taking? Authorizing Provider   isosorbide mononitrate (IMDUR) 30 MG extended release tablet Take 1 tablet by mouth daily 4/4/24   Tay Ocasio MD   metoprolol tartrate (LOPRESSOR) 25 MG tablet Take 0.5 tablets by mouth 2 times daily 4/3/24   Tay Ocasio MD   furosemide (LASIX) 20 MG tablet Take 2 tablets by mouth 2 times daily 4/3/24   Danis Maier MD   empagliflozin (JARDIANCE) 10 MG tablet Take 1 tablet by mouth daily    ProviderDre MD   vitamin D 25 MCG (1000 UT) CAPS Take 1 capsule by mouth daily    ProviderDre MD

## 2024-12-16 NOTE — OR NURSING
Patient evaluated in the ED for ultrasound guided (left) thoracentesis. Vitals obtained and consent signed per patient/telephone consent given per the patients daughter \"Radha\". Katy Blandon PA-C in to speak with the patient about the procedure, all questions answered. Patient rolled on his side, procedural site scanned and prepped. 1% Lidocaine administered to procedural site (left). 5 Bangladeshi centesis catheter inserted with ultrasound guidance, catheter connected to suction bottle. Patient tolerated procedure well. (850) ml drained of (hazy katy) colored pleural fluid. Centesis catheter removed post procedure. Specimens collected and sent to lab per order. Puncture site cleansed and dry dressing applied. No bleeding, swelling or complications noted. Post procedure vitals obtained. Portable CXR ordered and completed post procedure. Images reviewed per Katy Blandon PA-C and Dr Resendez. Nurse to nurse report given.

## 2024-12-16 NOTE — ED NOTES
Patient has 200mL of urine output at this time in bag before administration of lasix. Will update output

## 2024-12-17 ENCOUNTER — APPOINTMENT (OUTPATIENT)
Age: 89
DRG: 291 | End: 2024-12-17
Payer: MEDICARE

## 2024-12-17 PROBLEM — I42.8 NICM (NONISCHEMIC CARDIOMYOPATHY) (HCC): Status: ACTIVE | Noted: 2024-12-17

## 2024-12-17 LAB
ANION GAP SERPL CALCULATED.3IONS-SCNC: 16 MMOL/L (ref 7–16)
BASOPHILS # BLD: 0 K/UL (ref 0–0.2)
BASOPHILS NFR BLD: 0 % (ref 0–2)
BUN SERPL-MCNC: 36 MG/DL (ref 6–23)
CALCIUM SERPL-MCNC: 8.3 MG/DL (ref 8.6–10.2)
CHLORIDE SERPL-SCNC: 95 MMOL/L (ref 98–107)
CO2 SERPL-SCNC: 25 MMOL/L (ref 22–29)
CREAT SERPL-MCNC: 2.1 MG/DL (ref 0.7–1.2)
ECHO AO ASC DIAM: 3.5 CM
ECHO AO ASCENDING AORTA INDEX: 1.67 CM/M2
ECHO AR MAX VEL PISA: 3.3 M/S
ECHO AV AREA PEAK VELOCITY: 1 CM2
ECHO AV AREA VTI: 0.9 CM2
ECHO AV AREA/BSA PEAK VELOCITY: 0.5 CM2/M2
ECHO AV AREA/BSA VTI: 0.4 CM2/M2
ECHO AV CUSP MM: 1.4 CM
ECHO AV MEAN GRADIENT: 16 MMHG
ECHO AV MEAN VELOCITY: 1.9 M/S
ECHO AV PEAK GRADIENT: 26 MMHG
ECHO AV PEAK VELOCITY: 2.6 M/S
ECHO AV REGURGITANT PHT: 919.7 MILLISECOND
ECHO AV VELOCITY RATIO: 0.27
ECHO AV VTI: 53.7 CM
ECHO BSA: 2.11 M2
ECHO EST RA PRESSURE: 15 MMHG
ECHO LA DIAMETER INDEX: 2.63 CM/M2
ECHO LA DIAMETER: 5.5 CM
ECHO LA VOL A-L A2C: 108 ML (ref 18–58)
ECHO LA VOL A-L A4C: 121 ML (ref 18–58)
ECHO LA VOL MOD A2C: 106 ML (ref 18–58)
ECHO LA VOL MOD A4C: 116 ML (ref 18–58)
ECHO LA VOLUME AREA LENGTH: 121 ML
ECHO LA VOLUME INDEX A-L A2C: 52 ML/M2 (ref 16–34)
ECHO LA VOLUME INDEX A-L A4C: 58 ML/M2 (ref 16–34)
ECHO LA VOLUME INDEX AREA LENGTH: 58 ML/M2 (ref 16–34)
ECHO LA VOLUME INDEX MOD A2C: 51 ML/M2 (ref 16–34)
ECHO LA VOLUME INDEX MOD A4C: 56 ML/M2 (ref 16–34)
ECHO LV DP/DT: 542.38 MMHG/S
ECHO LV EDV A2C: 69 ML
ECHO LV EDV A4C: 76 ML
ECHO LV EDV BP: 76 ML (ref 67–155)
ECHO LV EDV INDEX A4C: 36 ML/M2
ECHO LV EDV INDEX BP: 36 ML/M2
ECHO LV EDV NDEX A2C: 33 ML/M2
ECHO LV EJECTION FRACTION A2C: 35 %
ECHO LV EJECTION FRACTION A4C: 15 %
ECHO LV EJECTION FRACTION BIPLANE: 29 % (ref 55–100)
ECHO LV ESV A2C: 45 ML
ECHO LV ESV A4C: 65 ML
ECHO LV ESV BP: 54 ML (ref 22–58)
ECHO LV ESV INDEX A2C: 22 ML/M2
ECHO LV ESV INDEX A4C: 31 ML/M2
ECHO LV ESV INDEX BP: 26 ML/M2
ECHO LV FRACTIONAL SHORTENING: 12 % (ref 28–44)
ECHO LV INTERNAL DIMENSION DIASTOLE INDEX: 2.34 CM/M2
ECHO LV INTERNAL DIMENSION DIASTOLIC: 4.9 CM (ref 4.2–5.9)
ECHO LV INTERNAL DIMENSION SYSTOLIC INDEX: 2.06 CM/M2
ECHO LV INTERNAL DIMENSION SYSTOLIC: 4.3 CM
ECHO LV IVSD: 1 CM (ref 0.6–1)
ECHO LV IVSS: 1.1 CM
ECHO LV MASS 2D: 164.3 G (ref 88–224)
ECHO LV MASS INDEX 2D: 78.6 G/M2 (ref 49–115)
ECHO LV POSTERIOR WALL DIASTOLIC: 0.9 CM (ref 0.6–1)
ECHO LV POSTERIOR WALL SYSTOLIC: 1 CM
ECHO LV RELATIVE WALL THICKNESS RATIO: 0.37
ECHO LVOT AREA: 3.8 CM2
ECHO LVOT AV VTI INDEX: 0.25
ECHO LVOT DIAM: 2.2 CM
ECHO LVOT MEAN GRADIENT: 1 MMHG
ECHO LVOT PEAK GRADIENT: 2 MMHG
ECHO LVOT PEAK VELOCITY: 0.7 M/S
ECHO LVOT STROKE VOLUME INDEX: 24.2 ML/M2
ECHO LVOT SV: 50.5 ML
ECHO LVOT VTI: 13.3 CM
ECHO MV "A" WAVE DURATION: 100.4 MSEC
ECHO MV A VELOCITY: 0.41 M/S
ECHO MV AREA PHT: 3.6 CM2
ECHO MV AREA VTI: 1.6 CM2
ECHO MV E DECELERATION TIME (DT): 121.7 MS
ECHO MV E VELOCITY: 1.39 M/S
ECHO MV E/A RATIO: 3.39
ECHO MV EROA PISA: 0.1 CM2
ECHO MV LVOT VTI INDEX: 2.36
ECHO MV MAX VELOCITY: 1.4 M/S
ECHO MV MEAN GRADIENT: 3 MMHG
ECHO MV MEAN VELOCITY: 0.7 M/S
ECHO MV PEAK GRADIENT: 7 MMHG
ECHO MV PRESSURE HALF TIME (PHT): 61.8 MS
ECHO MV REGURGITANT ALIASING (NYQUIST) VELOCITY: 34 CM/S
ECHO MV REGURGITANT RADIUS PISA: 0.58 CM
ECHO MV REGURGITANT VELOCITY PISA: 4.9 M/S
ECHO MV REGURGITANT VOLUME PISA: 22.3 ML
ECHO MV REGURGITANT VTIA: 152.1 CM
ECHO MV VTI: 31.4 CM
ECHO PULMONARY ARTERY SYSTOLIC PRESSURE (PASP): 59 MMHG
ECHO RIGHT VENTRICULAR SYSTOLIC PRESSURE (RVSP): 59 MMHG
ECHO RV INTERNAL DIMENSION: 4.3 CM
ECHO TV REGURGITANT MAX VELOCITY: 3.3 M/S
ECHO TV REGURGITANT PEAK GRADIENT: 44 MMHG
EKG ATRIAL RATE: 66 BPM
EKG ATRIAL RATE: 74 BPM
EKG Q-T INTERVAL: 480 MS
EKG Q-T INTERVAL: 484 MS
EKG QRS DURATION: 180 MS
EKG QRS DURATION: 184 MS
EKG QTC CALCULATION (BAZETT): 536 MS
EKG QTC CALCULATION (BAZETT): 537 MS
EKG R AXIS: -104 DEGREES
EKG R AXIS: -107 DEGREES
EKG T AXIS: 38 DEGREES
EKG T AXIS: 45 DEGREES
EKG VENTRICULAR RATE: 74 BPM
EKG VENTRICULAR RATE: 75 BPM
EOSINOPHIL # BLD: 0.06 K/UL (ref 0.05–0.5)
EOSINOPHILS RELATIVE PERCENT: 1 % (ref 0–6)
ERYTHROCYTE [DISTWIDTH] IN BLOOD BY AUTOMATED COUNT: 15.4 % (ref 11.5–15)
GFR, ESTIMATED: 29 ML/MIN/1.73M2
GLUCOSE SERPL-MCNC: 113 MG/DL (ref 74–99)
HCT VFR BLD AUTO: 31.6 % (ref 37–54)
HGB BLD-MCNC: 9.7 G/DL (ref 12.5–16.5)
LYMPHOCYTES NFR BLD: 0.5 K/UL (ref 1.5–4)
LYMPHOCYTES RELATIVE PERCENT: 8 % (ref 20–42)
MAGNESIUM SERPL-MCNC: 2.7 MG/DL (ref 1.6–2.6)
MCH RBC QN AUTO: 30.5 PG (ref 26–35)
MCHC RBC AUTO-ENTMCNC: 30.7 G/DL (ref 32–34.5)
MCV RBC AUTO: 99.4 FL (ref 80–99.9)
MICROORGANISM SPEC CULT: NO GROWTH
MONOCYTES NFR BLD: 0.56 K/UL (ref 0.1–0.95)
MONOCYTES NFR BLD: 9 % (ref 2–12)
NEUTROPHILS NFR BLD: 83 % (ref 43–80)
NEUTS SEG NFR BLD: 5.29 K/UL (ref 1.8–7.3)
PLATELET CONFIRMATION: NORMAL
PLATELET ESTIMATE: ABNORMAL
PLATELET, FLUORESCENCE: 148 K/UL (ref 130–450)
PMV BLD AUTO: 11.1 FL (ref 7–12)
POTASSIUM SERPL-SCNC: 4.2 MMOL/L (ref 3.5–5)
RBC # BLD AUTO: 3.18 M/UL (ref 3.8–5.8)
RBC # BLD: ABNORMAL 10*6/UL
SERVICE CMNT-IMP: NORMAL
SODIUM SERPL-SCNC: 136 MMOL/L (ref 132–146)
SPECIMEN DESCRIPTION: NORMAL
TROPONIN I SERPL HS-MCNC: 89 NG/L (ref 0–11)
WBC OTHER # BLD: 6.4 K/UL (ref 4.5–11.5)

## 2024-12-17 PROCEDURE — 6370000000 HC RX 637 (ALT 250 FOR IP): Performed by: INTERNAL MEDICINE

## 2024-12-17 PROCEDURE — 2060000000 HC ICU INTERMEDIATE R&B

## 2024-12-17 PROCEDURE — 99232 SBSQ HOSP IP/OBS MODERATE 35: CPT | Performed by: INTERNAL MEDICINE

## 2024-12-17 PROCEDURE — 2580000003 HC RX 258

## 2024-12-17 PROCEDURE — 36415 COLL VENOUS BLD VENIPUNCTURE: CPT

## 2024-12-17 PROCEDURE — 83735 ASSAY OF MAGNESIUM: CPT

## 2024-12-17 PROCEDURE — 6360000002 HC RX W HCPCS

## 2024-12-17 PROCEDURE — 93306 TTE W/DOPPLER COMPLETE: CPT

## 2024-12-17 PROCEDURE — APPSS180 APP SPLIT SHARED TIME > 60 MINUTES: Performed by: NURSE PRACTITIONER

## 2024-12-17 PROCEDURE — 85025 COMPLETE CBC W/AUTO DIFF WBC: CPT

## 2024-12-17 PROCEDURE — 84484 ASSAY OF TROPONIN QUANT: CPT

## 2024-12-17 PROCEDURE — 6370000000 HC RX 637 (ALT 250 FOR IP): Performed by: NURSE PRACTITIONER

## 2024-12-17 PROCEDURE — 6370000000 HC RX 637 (ALT 250 FOR IP)

## 2024-12-17 PROCEDURE — 99223 1ST HOSP IP/OBS HIGH 75: CPT | Performed by: INTERNAL MEDICINE

## 2024-12-17 PROCEDURE — 93283 PRGRMG EVAL IMPLANTABLE DFB: CPT | Performed by: INTERNAL MEDICINE

## 2024-12-17 PROCEDURE — 97165 OT EVAL LOW COMPLEX 30 MIN: CPT

## 2024-12-17 PROCEDURE — 93306 TTE W/DOPPLER COMPLETE: CPT | Performed by: INTERNAL MEDICINE

## 2024-12-17 PROCEDURE — 93010 ELECTROCARDIOGRAM REPORT: CPT | Performed by: INTERNAL MEDICINE

## 2024-12-17 PROCEDURE — P9047 ALBUMIN (HUMAN), 25%, 50ML: HCPCS | Performed by: NURSE PRACTITIONER

## 2024-12-17 PROCEDURE — 97161 PT EVAL LOW COMPLEX 20 MIN: CPT

## 2024-12-17 PROCEDURE — 6360000002 HC RX W HCPCS: Performed by: NURSE PRACTITIONER

## 2024-12-17 PROCEDURE — 2500000003 HC RX 250 WO HCPCS

## 2024-12-17 PROCEDURE — 80048 BASIC METABOLIC PNL TOTAL CA: CPT

## 2024-12-17 RX ORDER — LIDOCAINE 4 G/G
1 PATCH TOPICAL DAILY
Status: DISCONTINUED | OUTPATIENT
Start: 2024-12-17 | End: 2024-12-24 | Stop reason: HOSPADM

## 2024-12-17 RX ORDER — AMIODARONE HYDROCHLORIDE 200 MG/1
200 TABLET ORAL DAILY
Status: DISCONTINUED | OUTPATIENT
Start: 2024-12-17 | End: 2024-12-24 | Stop reason: HOSPADM

## 2024-12-17 RX ORDER — ALBUMIN (HUMAN) 12.5 G/50ML
25 SOLUTION INTRAVENOUS ONCE
Status: COMPLETED | OUTPATIENT
Start: 2024-12-17 | End: 2024-12-17

## 2024-12-17 RX ORDER — MIDODRINE HYDROCHLORIDE 5 MG/1
5 TABLET ORAL ONCE
Status: COMPLETED | OUTPATIENT
Start: 2024-12-17 | End: 2024-12-18

## 2024-12-17 RX ORDER — MIDODRINE HYDROCHLORIDE 5 MG/1
2.5 TABLET ORAL
Status: DISCONTINUED | OUTPATIENT
Start: 2024-12-18 | End: 2024-12-20

## 2024-12-17 RX ORDER — METOPROLOL SUCCINATE 25 MG/1
12.5 TABLET, EXTENDED RELEASE ORAL 2 TIMES DAILY
Status: DISCONTINUED | OUTPATIENT
Start: 2024-12-17 | End: 2024-12-24 | Stop reason: HOSPADM

## 2024-12-17 RX ADMIN — FUROSEMIDE 40 MG: 10 INJECTION, SOLUTION INTRAMUSCULAR; INTRAVENOUS at 09:19

## 2024-12-17 RX ADMIN — ACETAMINOPHEN 650 MG: 325 TABLET ORAL at 10:45

## 2024-12-17 RX ADMIN — DOCUSATE SODIUM 200 MG: 100 CAPSULE, LIQUID FILLED ORAL at 21:53

## 2024-12-17 RX ADMIN — METOPROLOL TARTRATE 12.5 MG: 25 TABLET, FILM COATED ORAL at 09:19

## 2024-12-17 RX ADMIN — FOLIC ACID 1 MG: 1 TABLET ORAL at 09:19

## 2024-12-17 RX ADMIN — FAMOTIDINE 20 MG: 20 TABLET ORAL at 21:53

## 2024-12-17 RX ADMIN — FERROUS SULFATE TAB 325 MG (65 MG ELEMENTAL FE) 325 MG: 325 (65 FE) TAB at 09:20

## 2024-12-17 RX ADMIN — ACETAMINOPHEN 650 MG: 325 TABLET ORAL at 04:34

## 2024-12-17 RX ADMIN — ACETAMINOPHEN 650 MG: 325 TABLET ORAL at 16:16

## 2024-12-17 RX ADMIN — MIRTAZAPINE 15 MG: 15 TABLET, FILM COATED ORAL at 21:53

## 2024-12-17 RX ADMIN — SODIUM CHLORIDE, PRESERVATIVE FREE 10 ML: 5 INJECTION INTRAVENOUS at 09:20

## 2024-12-17 RX ADMIN — APIXABAN 2.5 MG: 2.5 TABLET, FILM COATED ORAL at 21:53

## 2024-12-17 RX ADMIN — AMIODARONE HYDROCHLORIDE 200 MG: 200 TABLET ORAL at 16:12

## 2024-12-17 RX ADMIN — CYANOCOBALAMIN TAB 1000 MCG 500 MCG: 1000 TAB at 09:20

## 2024-12-17 RX ADMIN — ALBUMIN (HUMAN) 25 G: 0.25 INJECTION, SOLUTION INTRAVENOUS at 21:53

## 2024-12-17 RX ADMIN — SODIUM CHLORIDE, PRESERVATIVE FREE 10 ML: 5 INJECTION INTRAVENOUS at 21:53

## 2024-12-17 RX ADMIN — Medication 1000 UNITS: at 21:53

## 2024-12-17 ASSESSMENT — PAIN - FUNCTIONAL ASSESSMENT: PAIN_FUNCTIONAL_ASSESSMENT: ACTIVITIES ARE NOT PREVENTED

## 2024-12-17 ASSESSMENT — PAIN DESCRIPTION - ONSET: ONSET: ON-GOING

## 2024-12-17 ASSESSMENT — PAIN SCALES - GENERAL
PAINLEVEL_OUTOF10: 6
PAINLEVEL_OUTOF10: 0
PAINLEVEL_OUTOF10: 7
PAINLEVEL_OUTOF10: 6
PAINLEVEL_OUTOF10: 0
PAINLEVEL_OUTOF10: 9
PAINLEVEL_OUTOF10: 0

## 2024-12-17 ASSESSMENT — PAIN DESCRIPTION - PAIN TYPE: TYPE: ACUTE PAIN

## 2024-12-17 ASSESSMENT — PAIN DESCRIPTION - FREQUENCY: FREQUENCY: CONTINUOUS

## 2024-12-17 ASSESSMENT — PAIN DESCRIPTION - DESCRIPTORS: DESCRIPTORS: ACHING

## 2024-12-17 ASSESSMENT — PAIN DESCRIPTION - LOCATION
LOCATION: FLANK
LOCATION: BACK

## 2024-12-17 NOTE — CARE COORDINATION
Social Work/Discharge Planning:  Met with patient and completed initial assessment.  Explained Social Work role and discussed transition of care/discharge planning.  Patient daughter Radha was also present.  Patient admitted from [facility].  He states goal is to return to [facility] following his rehab stay at [facility].  He had a left thoracentesis 12/16.  Called liaison Marybeth with [facility] and confirmed patient will need a pre-cert to return.  Electronic N-17 in epic.  Will continue to follow and assist with discharge planning.  Electronically signed by FLORIAN Cortez on 12/17/2024 at 2:42 PM

## 2024-12-17 NOTE — DISCHARGE INSTR - COC
Continuity of Care Form    Patient Name: Young Watkins   :  1930  MRN:  [id number]    Admit date:  12/15/2024  Discharge date:  2024    Code Status Order: Full Code   Advance Directives:   Advance Care Flowsheet Documentation             Admitting Physician:  Feliberto Che MD  PCP: Shon Lynch MD    Discharging Nurse: RT  Discharging Hospital Unit/Room#: 0433/0433-A  Discharging Unit Phone Number: [phone]    Emergency Contact:   Extended Emergency Contact Information  Primary Emergency Contact: Radha Cavazos  Address: [address]  Home Phone: [phone]  Mobile Phone: [phone]  Relation: Child  Preferred language: English   needed? No  Secondary Emergency Contact: Ozzy Watkins  Address: [address]  Home Phone: [phone]  Mobile Phone: [phone]  Relation: Child  Preferred language: English   needed? No    Past Surgical History:  History reviewed. No pertinent surgical history.    Immunization History:   Immunization History   Administered Date(s) Administered    COVID-19, PFIZER GRAY top, DO NOT Dilute, (age 12 y+), IM, 30 mcg/0.3 mL 2022    COVID-19, PFIZER PURPLE top, DILUTE for use, (age 12 y+), 30mcg/0.3mL 2021, 2021, 2021       Active Problems:  Patient Active Problem List   Diagnosis Code    Acute heart failure, unspecified heart failure type (HCC) I50.9    Orthostatic hypotension I95.1    Controlled type 2 diabetes mellitus without complication (HCC) E11.9    Primary hypertension I10    Acute on chronic congestive heart failure (HCC) I50.9    Syncope and collapse R55    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

## 2024-12-17 NOTE — ACP (ADVANCE CARE PLANNING)
Advance Care Planning   Healthcare Decision Maker:    Primary Decision Maker: Radha Cavazos - Child - [phone]    Secondary Decision Maker: Ozzy Watkins - Child - [phone]    Click here to complete Healthcare Decision Makers including selection of the Healthcare Decision Maker Relationship (ie \"Primary\").

## 2024-12-18 PROBLEM — R31.21 ASYMPTOMATIC MICROSCOPIC HEMATURIA: Status: ACTIVE | Noted: 2024-12-18

## 2024-12-18 LAB
25(OH)D3 SERPL-MCNC: 33.8 NG/ML (ref 30–100)
ANION GAP SERPL CALCULATED.3IONS-SCNC: 9 MMOL/L (ref 7–16)
BASOPHILS # BLD: 0.02 K/UL (ref 0–0.2)
BASOPHILS NFR BLD: 0 % (ref 0–2)
BUN SERPL-MCNC: 40 MG/DL (ref 6–23)
CALCIUM SERPL-MCNC: 8.4 MG/DL (ref 8.6–10.2)
CHLORIDE SERPL-SCNC: 100 MMOL/L (ref 98–107)
CO2 SERPL-SCNC: 27 MMOL/L (ref 22–29)
CREAT SERPL-MCNC: 2.2 MG/DL (ref 0.7–1.2)
EOSINOPHIL # BLD: 0.03 K/UL (ref 0.05–0.5)
EOSINOPHILS RELATIVE PERCENT: 1 % (ref 0–6)
ERYTHROCYTE [DISTWIDTH] IN BLOOD BY AUTOMATED COUNT: 15.2 % (ref 11.5–15)
GFR, ESTIMATED: 28 ML/MIN/1.73M2
GLUCOSE SERPL-MCNC: 168 MG/DL (ref 74–99)
HCT VFR BLD AUTO: 30.7 % (ref 37–54)
HGB BLD-MCNC: 9.5 G/DL (ref 12.5–16.5)
IMM GRANULOCYTES # BLD AUTO: <0.03 K/UL (ref 0–0.58)
IMM GRANULOCYTES NFR BLD: 0 % (ref 0–5)
LYMPHOCYTES NFR BLD: 0.56 K/UL (ref 1.5–4)
LYMPHOCYTES RELATIVE PERCENT: 10 % (ref 20–42)
MAGNESIUM SERPL-MCNC: 2.6 MG/DL (ref 1.6–2.6)
MCH RBC QN AUTO: 30.7 PG (ref 26–35)
MCHC RBC AUTO-ENTMCNC: 30.9 G/DL (ref 32–34.5)
MCV RBC AUTO: 99.4 FL (ref 80–99.9)
MICROORGANISM SPEC CULT: NORMAL
MICROORGANISM/AGENT SPEC: NORMAL
MONOCYTES NFR BLD: 0.61 K/UL (ref 0.1–0.95)
MONOCYTES NFR BLD: 11 % (ref 2–12)
NEUTROPHILS NFR BLD: 78 % (ref 43–80)
NEUTS SEG NFR BLD: 4.42 K/UL (ref 1.8–7.3)
NON-GYN CYTOLOGY REPORT: NORMAL
PHOSPHATE SERPL-MCNC: 3.9 MG/DL (ref 2.5–4.5)
PLATELET # BLD AUTO: 150 K/UL (ref 130–450)
PMV BLD AUTO: 10.5 FL (ref 7–12)
POTASSIUM SERPL-SCNC: 3.6 MMOL/L (ref 3.5–5)
PTH-INTACT SERPL-MCNC: 60.9 PG/ML (ref 15–65)
RBC # BLD AUTO: 3.09 M/UL (ref 3.8–5.8)
SERVICE CMNT-IMP: NORMAL
SODIUM SERPL-SCNC: 136 MMOL/L (ref 132–146)
SPECIMEN DESCRIPTION: NORMAL
WBC OTHER # BLD: 5.7 K/UL (ref 4.5–11.5)

## 2024-12-18 PROCEDURE — 6370000000 HC RX 637 (ALT 250 FOR IP): Performed by: NURSE PRACTITIONER

## 2024-12-18 PROCEDURE — 83735 ASSAY OF MAGNESIUM: CPT

## 2024-12-18 PROCEDURE — 84100 ASSAY OF PHOSPHORUS: CPT

## 2024-12-18 PROCEDURE — 6370000000 HC RX 637 (ALT 250 FOR IP): Performed by: INTERNAL MEDICINE

## 2024-12-18 PROCEDURE — 99232 SBSQ HOSP IP/OBS MODERATE 35: CPT | Performed by: INTERNAL MEDICINE

## 2024-12-18 PROCEDURE — 85025 COMPLETE CBC W/AUTO DIFF WBC: CPT

## 2024-12-18 PROCEDURE — 6370000000 HC RX 637 (ALT 250 FOR IP)

## 2024-12-18 PROCEDURE — 99222 1ST HOSP IP/OBS MODERATE 55: CPT | Performed by: NURSE PRACTITIONER

## 2024-12-18 PROCEDURE — 51700 IRRIGATION OF BLADDER: CPT

## 2024-12-18 PROCEDURE — 6360000002 HC RX W HCPCS

## 2024-12-18 PROCEDURE — 97535 SELF CARE MNGMENT TRAINING: CPT

## 2024-12-18 PROCEDURE — 83970 ASSAY OF PARATHORMONE: CPT

## 2024-12-18 PROCEDURE — 80048 BASIC METABOLIC PNL TOTAL CA: CPT

## 2024-12-18 PROCEDURE — 36415 COLL VENOUS BLD VENIPUNCTURE: CPT

## 2024-12-18 PROCEDURE — 82306 VITAMIN D 25 HYDROXY: CPT

## 2024-12-18 PROCEDURE — 97530 THERAPEUTIC ACTIVITIES: CPT

## 2024-12-18 PROCEDURE — 2060000000 HC ICU INTERMEDIATE R&B

## 2024-12-18 PROCEDURE — 2500000003 HC RX 250 WO HCPCS

## 2024-12-18 PROCEDURE — 6360000002 HC RX W HCPCS: Performed by: INTERNAL MEDICINE

## 2024-12-18 PROCEDURE — 99233 SBSQ HOSP IP/OBS HIGH 50: CPT | Performed by: INTERNAL MEDICINE

## 2024-12-18 RX ADMIN — DOCUSATE SODIUM 200 MG: 100 CAPSULE, LIQUID FILLED ORAL at 20:01

## 2024-12-18 RX ADMIN — ACETAMINOPHEN 650 MG: 325 TABLET ORAL at 20:02

## 2024-12-18 RX ADMIN — FUROSEMIDE 40 MG: 10 INJECTION, SOLUTION INTRAMUSCULAR; INTRAVENOUS at 00:29

## 2024-12-18 RX ADMIN — FAMOTIDINE 20 MG: 20 TABLET ORAL at 22:38

## 2024-12-18 RX ADMIN — FOLIC ACID 1 MG: 1 TABLET ORAL at 08:39

## 2024-12-18 RX ADMIN — MIDODRINE HYDROCHLORIDE 2.5 MG: 5 TABLET ORAL at 08:39

## 2024-12-18 RX ADMIN — ACETAMINOPHEN 650 MG: 325 TABLET ORAL at 12:42

## 2024-12-18 RX ADMIN — APIXABAN 2.5 MG: 2.5 TABLET, FILM COATED ORAL at 08:40

## 2024-12-18 RX ADMIN — FUROSEMIDE 40 MG: 10 INJECTION, SOLUTION INTRAMUSCULAR; INTRAVENOUS at 08:39

## 2024-12-18 RX ADMIN — MIDODRINE HYDROCHLORIDE 2.5 MG: 5 TABLET ORAL at 12:40

## 2024-12-18 RX ADMIN — Medication 1000 UNITS: at 22:37

## 2024-12-18 RX ADMIN — FERROUS SULFATE TAB 325 MG (65 MG ELEMENTAL FE) 325 MG: 325 (65 FE) TAB at 08:40

## 2024-12-18 RX ADMIN — MIDODRINE HYDROCHLORIDE 5 MG: 5 TABLET ORAL at 00:28

## 2024-12-18 RX ADMIN — IRON SUCROSE 200 MG: 20 INJECTION, SOLUTION INTRAVENOUS at 08:39

## 2024-12-18 RX ADMIN — FUROSEMIDE 40 MG: 10 INJECTION, SOLUTION INTRAMUSCULAR; INTRAVENOUS at 17:50

## 2024-12-18 RX ADMIN — PRAVASTATIN SODIUM 40 MG: 20 TABLET ORAL at 00:28

## 2024-12-18 RX ADMIN — CYANOCOBALAMIN TAB 1000 MCG 500 MCG: 1000 TAB at 08:40

## 2024-12-18 RX ADMIN — MIRTAZAPINE 15 MG: 15 TABLET, FILM COATED ORAL at 20:01

## 2024-12-18 RX ADMIN — SODIUM CHLORIDE, PRESERVATIVE FREE 10 ML: 5 INJECTION INTRAVENOUS at 08:42

## 2024-12-18 RX ADMIN — PRAVASTATIN SODIUM 40 MG: 20 TABLET ORAL at 20:00

## 2024-12-18 RX ADMIN — METOPROLOL SUCCINATE 12.5 MG: 25 TABLET, EXTENDED RELEASE ORAL at 08:40

## 2024-12-18 RX ADMIN — AMIODARONE HYDROCHLORIDE 200 MG: 200 TABLET ORAL at 08:40

## 2024-12-18 RX ADMIN — MIDODRINE HYDROCHLORIDE 2.5 MG: 5 TABLET ORAL at 17:50

## 2024-12-18 ASSESSMENT — PAIN SCALES - WONG BAKER: WONGBAKER_NUMERICALRESPONSE: NO HURT

## 2024-12-18 ASSESSMENT — PAIN DESCRIPTION - LOCATION: LOCATION: BUTTOCKS

## 2024-12-18 ASSESSMENT — PAIN SCALES - GENERAL
PAINLEVEL_OUTOF10: 2
PAINLEVEL_OUTOF10: 3

## 2024-12-18 ASSESSMENT — PAIN DESCRIPTION - ORIENTATION: ORIENTATION: RIGHT;LEFT

## 2024-12-18 ASSESSMENT — PAIN DESCRIPTION - DESCRIPTORS: DESCRIPTORS: ACHING

## 2024-12-18 NOTE — CARE COORDINATION
Social Work/Discharge Planning:  Called Marybeth with [facility] and requested for facility to start pre-cert.  Marybeth states facility started pre-cert this morning.  Will continue to follow and wait for pre-cert.  Electronically signed by FLORIAN Cortez on 12/18/2024 at 2:08 PM

## 2024-12-18 NOTE — CONSULTS
Inpatient Cardiology Consultation      Reason for Consult: CHF exacerbation    Consulting Physician: Dr. Ocasio    Requesting Physician:     Date of Consultation: 12/17/2024    HISTORY OF PRESENT ILLNESS:   Young Watkins  is a 89 y.o.  male known to [facility] cardiology.  Inpatient cardiology consult completed on 4-1-2024 per Dr. Ocasio.  Patient follows with Viki mitchell.Dr. Atkins, primary cardiologist.    PMH: see below    [place] ED 12/15/2024, via ambulance complaint of shortness of breath.  Patient presented emergency department with vague complaints of chest pain and shortness of breath with some back pain and apparently reported fever at rehab facility today that he was given Tylenol prior to arrival.  He also reports a slight dry cough.  Denied chest pain or back pain on arrival.  Chronically anticoagulated with Eliquis.He has a history of radiation cystitis with chronic Bingham catheter and chronic mild hematuria.   ED diagnosis: Pleural effusion on left, hyperkalemia, acute cystitis without hematuria, acute on chronic CHF, CKD.  ED treatment: Tylenol 650 mg oral, Rocephin 1000 mg IV, iron 325 mg oral, folic acid 1 mg oral, Lasix 40 mg IV x 2 doses, normal saline bolus 500 mL, vitamin B12 500 mcg oral.  ED course:  Patient was given 40 mg of IV Lasix for CHF and fluid overload as well as Has decreased potassium slightly magnesium mildly elevated at 2.9.  TSH was normal.  Coags were normal.  First troponin was 102 near baseline second troponin pending.  CBC was normal white count was normal at 10 hemoglobin normal at 10.  BNP elevated at 8300.  UTI was consistent with positive nitrites and leuks in urine was given 1 g of IV Rocephin.  Lactic acid was normal.   EKG shows ventricular paced rhythm no change from prior EKG  Echocardiogram ordered and pending  Arrival vitals: Hypoxia noted 80% in ED  Significant Labs:   CBC with Auto Differential   Result Value Ref Range     WBC 10.8 4.5 - 11.5 k/uL 
    12/18/2024 3:29 PM  Young Watkins  [id number]     Chief Complaint:    Hematuria    History of Present Illness:      The patient is a 89 y.o. male patient who presented to the hospital with complaints of shortness of breath.    Urology was asked to consult for hematuria.  He states that he had a cystoscopy done at [facility] for evaluation of hematuria.  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  After the procedure a Bingham catheter was placed.  He did notice clots shortly after the procedure.  Currently he has a three-way catheter in place draining well.  He does have dark red urine in the tubing and bag.        Past Medical History:   Diagnosis Date    A-fib (HCC)     CHF (congestive heart failure) (HCC)     CKD (chronic kidney disease)     DM (diabetes mellitus) (HCC)     Radiation cystitis          History reviewed. No pertinent surgical history.    Medications Prior to Admission:    Medications Prior to Admission: amiodarone (CORDARONE) 200 MG tablet, Take 1 tablet by mouth 2 times daily  bumetanide (BUMEX) 1 MG tablet, Take 1 tablet by mouth 2 times daily Indications: HOLD IF SBP<100  chlorhexidine gluconate (HIBICLENS) 4 % SOLN external solution, Apply 1 application  topically See Admin Instructions THE NIGHT BEFORE PROCEDURE (12/18/24) & MORNING OF PROCEDURE (12/19/24)  metoprolol succinate (TOPROL XL) 25 MG extended release tablet, Take 1 tablet by mouth every morning Indications: HOLD IF SBP <100 AND/OR HR<50  potassium chloride (KLOR-CON M) 20 MEQ extended release tablet, Take 1 tablet by mouth every morning  tamsulosin (FLOMAX) 0.4 MG capsule, Take 1 capsule by mouth nightly  empagliflozin (JARDIANCE) 10 MG tablet, Take 1 tablet by mouth daily  vitamin D 25 MCG (1000 UT) CAPS, Take 1 capsule by mouth daily  cyanocobalamin 500 MCG tablet, Take 1 tablet by mouth daily  famotidine (PEPCID) 10 MG tablet, 
  Palliative Care Department  [phone]  Palliative Care Initial Consult  Provider RUSS Barrett CNP      PATIENT: Young Watkins  : 1930  MRN: [id number]  ADMISSION DATE: 12/15/2024  4:08 PM  Referring Provider: Juan Land MD     Palliative Medicine was consulted on hospital day 3 for assistance with Goals of care    HPI:     Clinical Summary:Young Watkins is a 89 y.o. y/o male with a history of CHF, atrial fibrillation on Eliquis, radiation cystitis with chronic Bingham catheter, and chronic hematuria  who presented to [facility] on 12/15/2024 with shortness of breath.  He was found to have a large left pleural effusion and trace right pleural effusion.  He underwent a left thoracentesis.    ASSESSMENT/PLAN:     Pertinent Hospital Diagnoses     Large left pleural effusion s/p thoracentesis  Decompensated heart failure  Valvular heart disease  Acute cystitis with hematuria    Palliative Care Encounter / Counseling Regarding Goals of Care  Please see detailed goals of care discussion as below  At this time, Young Watkins, Does have capacity for medical decision-making.  Capacity is time limited and situation/question specific  During encounter Radha was surrogate medical decision-maker  Outcome of goals of care meeting:  Continue current medical management  CODE STATUS changed to limited DNR CCA/DNI  Code status Limited DNR CCA/DNI  Advanced Directives: no POA or living will in [facility].  DNR CCA form in [facility]  Surrogate/Legal NOK:  Radha Cavazos - Child - [phone]   Ozzy Watkins - Child - [phone]     Spiritual assessment: no spiritual distress identified  Bereavement and grief: to be determined  Referrals to: none today    Thank you for the opportunity to participate in the care of Young Watkins.     RUSS Barrett CNP   Palliative Medicine     SUBJECTIVE:     Details of Conversation: Chart reviewed and met with the patient and his daughter Radha at the bedside.  I introduced palliative 
12/17/2024 0413  Gross per 24 hour   Intake 240 ml   Output 450 ml   Net -210 ml       [] Nursing staff/manager notified of inaccurate britton weights or I/O      Discharge Plan:  Above identified barriers reviewed and needs addressed    Patient/family educated on daily monitoring tools for CHF, made aware of signs and symptoms of worsening HF and to notify provider immediately of change in symptoms.     Heart Failure Home Instructions placed in patient's discharge instructions    Per AHA guidelines patient to be closely monitored following discharge with 7 day follow up appointment    Scheduling with the CHF clinic New consult to CHF clinic, appointment scheduled    Future Appointments   Date Time Provider Department Center   1/8/2025  9:30 AM [facility] CHF ROOM 3 [facility]   2/13/2025  9:15 AM Chayito De Leon, APRN - CNP [facility]       Patient Education:  Self Monitoring/management:  Reviewed the introduction to Heart Failure, the HF zones, signs and symptoms to report on day 1 of onset, medications, medication compliance, the importance of obtaining daily weights, following a low sodium diet, reading food labels for the sodium content, keeping physician appointments, and smoking cessation.  Discussed writing / tracking daily weights on a calendar / log because a 5 pound gain in 1 week can sneak up if you are not tracking it.   Advised patient they can reduce the risk for Heart Failure exacerbations by modifying / controlling the risk factors.  Discussed self-managed care which includes the following: take medications as prescribed, report any intolerable side effects of medications to the medical provider (PCP or cardiologist), do not just stop taking the medication; follow a cardiac heart healthy / low sodium diet; weigh yourself daily, exercise regularly- per doctor recommendation and not to smoke or use an excess amount of alcohol.  Discussed calling the cardiologist / doctor with a weight gain of 3 
12/17/24  0631    135 136   K 5.3* 5.6* 4.2    100 95*   CO2 27 28 25   BUN 32* 37* 36*   CREATININE 2.0* 2.1* 2.1*   GLUCOSE 130* 129* 113*      Hepatic:   Recent Labs     12/15/24  1722   AST 29   ALT 15   BILITOT 0.6   ALKPHOS 117     Troponin: No results for input(s): \"TROPONINI\" in the last 72 hours.  BNP: No results for input(s): \"BNP\" in the last 72 hours.  Lipids:   Recent Labs     12/16/24  1237   CHOL 87   HDL 51     ABGs: No results found for: \"PHART\", \"PO2ART\", \"[id number]\"  INR:   Recent Labs     12/16/24  1249   INR 1.4       ASSESSMENT:    Chronic kidney disease stage 4  Baseline Cr ~2 mg/dL with eGFR 29 mL/min/1.73m2  Plan    Continue monitor renal function and electrolytes at least daily  Continue to monitor I/Os   Avoid nephrotoxic medications including NSAID and iodinated contrast as able    Hyperkalemia  Potassium 4.2 mmol/L.   Continue diuretics    Electrolytes  Sodium 136 mmol/L.  mg/dL.   Last magnesium 2.7 mg/dL.   Plan  No changes   Monitor electrolytes     Mineral bone disease  Last Ca 8.3 mg/dL with an albumin of 3.4  Last phosphorus 3.9 mg/dL  Plan  Will get vitamin D level and PTH   Continue to monitor levels    Acid base  Bicarbonate 25 mmol/L today.   Chloride 95 mmol/dL today.   AG 16 mmol/L today with an albumin on 3.4 g/dL.  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  
without adenopathy  Cardiovascular: regular rate and rhythm without murmur or gallop  Respiratory: Clear to auscultation bilaterally without wheezing or crackles.  Air entry is symmetric  Abdomen: soft, non-tender, non-distended, normal bowel sounds  Extremities: warm, no edema, no clubbing  Skin: no rash or lesion  Neurologic: CN II-XII grossly intact, no focal deficits    Pulmonary Function Testing       Imaging personally reviewed:    IMPRESSION:  1. Nondiagnostic assessment for pulmonary emboli without IV contrast.  2. Moderate/large left pleural effusion and trace right pleural effusion with  associated atelectasis.  3. Cardiomegaly with small pericardial effusion.  4. Dilated central pulmonary vasculature suggestive of pulmonary hypertension.  5. Small hiatal hernia.  6. Gynecomastia.       Noncontrast CT chest 12/6/2024        Echo:4/2024    Left Ventricle: Normal left ventricular systolic function with a visually estimated EF of 35 - 40%. Left ventricle size is normal. Findings consistent with mild concentric hypertrophy. Normal wall motion. Indeterminate diastolic function.    Right Ventricle: Moderately reduced systolic function.    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 AV mean gradient is 13 mmHg. AV area by continuity VTI is 1.0 cm2. AV Stroke Volume index is 17.7 mL/m2.    Mitral Valve: Moderate to severe regurgitation.    Tricuspid Valve: Moderate to severe regurgitation.    Left Atrium: Left atrium is severely dilated.    Right Atrium: Right atrium is moderately dilated.    Aorta: Normal sized aortic root. Mildly dilated ascending aorta. Ao ascending diameter is 3.7 cm.    Image quality is adequate.       Labs:  Lab Results   Component Value Date/Time    WBC 10.8 12/15/2024 05:22 PM    RBC 3.50 12/15/2024 05:22 PM    HGB 10.5 12/15/2024 05:22 PM    HCT 34.7 12/15/2024 05:22 PM    MCV 99.1 12/15/2024 05:22 PM    MCH 30.0 12/15/2024 05:22 PM    MCHC 30.3

## 2024-12-19 LAB
ANION GAP SERPL CALCULATED.3IONS-SCNC: 11 MMOL/L (ref 7–16)
BASOPHILS # BLD: 0.03 K/UL (ref 0–0.2)
BASOPHILS NFR BLD: 1 % (ref 0–2)
BUN SERPL-MCNC: 50 MG/DL (ref 6–23)
CALCIUM SERPL-MCNC: 8.2 MG/DL (ref 8.6–10.2)
CHLORIDE SERPL-SCNC: 98 MMOL/L (ref 98–107)
CO2 SERPL-SCNC: 26 MMOL/L (ref 22–29)
CREAT SERPL-MCNC: 2 MG/DL (ref 0.7–1.2)
EOSINOPHIL # BLD: 0.1 K/UL (ref 0.05–0.5)
EOSINOPHILS RELATIVE PERCENT: 2 % (ref 0–6)
ERYTHROCYTE [DISTWIDTH] IN BLOOD BY AUTOMATED COUNT: 15 % (ref 11.5–15)
GFR, ESTIMATED: 31 ML/MIN/1.73M2
GLUCOSE SERPL-MCNC: 107 MG/DL (ref 74–99)
HCT VFR BLD AUTO: 31.1 % (ref 37–54)
HGB BLD-MCNC: 9.7 G/DL (ref 12.5–16.5)
IMM GRANULOCYTES # BLD AUTO: <0.03 K/UL (ref 0–0.58)
IMM GRANULOCYTES NFR BLD: 0 % (ref 0–5)
LYMPHOCYTES NFR BLD: 0.69 K/UL (ref 1.5–4)
LYMPHOCYTES RELATIVE PERCENT: 15 % (ref 20–42)
MAGNESIUM SERPL-MCNC: 2.4 MG/DL (ref 1.6–2.6)
MCH RBC QN AUTO: 30.3 PG (ref 26–35)
MCHC RBC AUTO-ENTMCNC: 31.2 G/DL (ref 32–34.5)
MCV RBC AUTO: 97.2 FL (ref 80–99.9)
MONOCYTES NFR BLD: 0.65 K/UL (ref 0.1–0.95)
MONOCYTES NFR BLD: 14 % (ref 2–12)
NEUTROPHILS NFR BLD: 69 % (ref 43–80)
NEUTS SEG NFR BLD: 3.24 K/UL (ref 1.8–7.3)
PHOSPHATE SERPL-MCNC: 4 MG/DL (ref 2.5–4.5)
PLATELET # BLD AUTO: 152 K/UL (ref 130–450)
PMV BLD AUTO: 10.8 FL (ref 7–12)
POTASSIUM SERPL-SCNC: 3.5 MMOL/L (ref 3.5–5)
RBC # BLD AUTO: 3.2 M/UL (ref 3.8–5.8)
SODIUM SERPL-SCNC: 135 MMOL/L (ref 132–146)
WBC OTHER # BLD: 4.7 K/UL (ref 4.5–11.5)

## 2024-12-19 PROCEDURE — 6370000000 HC RX 637 (ALT 250 FOR IP): Performed by: INTERNAL MEDICINE

## 2024-12-19 PROCEDURE — 99233 SBSQ HOSP IP/OBS HIGH 50: CPT | Performed by: INTERNAL MEDICINE

## 2024-12-19 PROCEDURE — 6370000000 HC RX 637 (ALT 250 FOR IP)

## 2024-12-19 PROCEDURE — 51700 IRRIGATION OF BLADDER: CPT

## 2024-12-19 PROCEDURE — 6360000002 HC RX W HCPCS

## 2024-12-19 PROCEDURE — 2060000000 HC ICU INTERMEDIATE R&B

## 2024-12-19 PROCEDURE — 84100 ASSAY OF PHOSPHORUS: CPT

## 2024-12-19 PROCEDURE — 80048 BASIC METABOLIC PNL TOTAL CA: CPT

## 2024-12-19 PROCEDURE — 6370000000 HC RX 637 (ALT 250 FOR IP): Performed by: NURSE PRACTITIONER

## 2024-12-19 PROCEDURE — 6360000002 HC RX W HCPCS: Performed by: INTERNAL MEDICINE

## 2024-12-19 PROCEDURE — 2500000003 HC RX 250 WO HCPCS

## 2024-12-19 PROCEDURE — 85025 COMPLETE CBC W/AUTO DIFF WBC: CPT

## 2024-12-19 PROCEDURE — 83735 ASSAY OF MAGNESIUM: CPT

## 2024-12-19 PROCEDURE — 97530 THERAPEUTIC ACTIVITIES: CPT

## 2024-12-19 PROCEDURE — 99232 SBSQ HOSP IP/OBS MODERATE 35: CPT | Performed by: INTERNAL MEDICINE

## 2024-12-19 RX ADMIN — MIDODRINE HYDROCHLORIDE 2.5 MG: 5 TABLET ORAL at 17:08

## 2024-12-19 RX ADMIN — FOLIC ACID 1 MG: 1 TABLET ORAL at 09:11

## 2024-12-19 RX ADMIN — MIRTAZAPINE 15 MG: 15 TABLET, FILM COATED ORAL at 20:30

## 2024-12-19 RX ADMIN — CYANOCOBALAMIN TAB 1000 MCG 500 MCG: 1000 TAB at 09:11

## 2024-12-19 RX ADMIN — FAMOTIDINE 20 MG: 20 TABLET ORAL at 20:30

## 2024-12-19 RX ADMIN — PRAVASTATIN SODIUM 40 MG: 20 TABLET ORAL at 20:30

## 2024-12-19 RX ADMIN — FERROUS SULFATE TAB 325 MG (65 MG ELEMENTAL FE) 325 MG: 325 (65 FE) TAB at 09:11

## 2024-12-19 RX ADMIN — FUROSEMIDE 40 MG: 10 INJECTION, SOLUTION INTRAMUSCULAR; INTRAVENOUS at 09:11

## 2024-12-19 RX ADMIN — METOPROLOL SUCCINATE 12.5 MG: 25 TABLET, EXTENDED RELEASE ORAL at 20:30

## 2024-12-19 RX ADMIN — DOCUSATE SODIUM 200 MG: 100 CAPSULE, LIQUID FILLED ORAL at 20:30

## 2024-12-19 RX ADMIN — Medication 1000 UNITS: at 20:30

## 2024-12-19 RX ADMIN — SODIUM CHLORIDE, PRESERVATIVE FREE 10 ML: 5 INJECTION INTRAVENOUS at 20:30

## 2024-12-19 RX ADMIN — MIDODRINE HYDROCHLORIDE 2.5 MG: 5 TABLET ORAL at 09:11

## 2024-12-19 RX ADMIN — IRON SUCROSE 200 MG: 20 INJECTION, SOLUTION INTRAVENOUS at 09:11

## 2024-12-19 RX ADMIN — AMIODARONE HYDROCHLORIDE 200 MG: 200 TABLET ORAL at 09:11

## 2024-12-19 RX ADMIN — SODIUM CHLORIDE, PRESERVATIVE FREE 10 ML: 5 INJECTION INTRAVENOUS at 09:12

## 2024-12-19 RX ADMIN — FUROSEMIDE 40 MG: 10 INJECTION, SOLUTION INTRAMUSCULAR; INTRAVENOUS at 17:08

## 2024-12-19 RX ADMIN — METOPROLOL SUCCINATE 12.5 MG: 25 TABLET, EXTENDED RELEASE ORAL at 09:11

## 2024-12-19 RX ADMIN — EMPAGLIFLOZIN 10 MG: 10 TABLET, FILM COATED ORAL at 09:15

## 2024-12-19 RX ADMIN — MIDODRINE HYDROCHLORIDE 2.5 MG: 5 TABLET ORAL at 11:41

## 2024-12-19 NOTE — CARE COORDINATION
Social Work/Discharge Planning:  Shawnee with [facility] states patient pre-cert is good until tomorrow.  Will continue to follow.  Electronically signed by FLORIAN Cortez on 12/19/2024 at 2:42 PM

## 2024-12-20 LAB
ANION GAP SERPL CALCULATED.3IONS-SCNC: 9 MMOL/L (ref 7–16)
BUN SERPL-MCNC: 60 MG/DL (ref 6–23)
CALCIUM SERPL-MCNC: 8.3 MG/DL (ref 8.6–10.2)
CHLORIDE SERPL-SCNC: 99 MMOL/L (ref 98–107)
CO2 SERPL-SCNC: 27 MMOL/L (ref 22–29)
CREAT SERPL-MCNC: 1.8 MG/DL (ref 0.7–1.2)
GFR, ESTIMATED: 34 ML/MIN/1.73M2
GLUCOSE SERPL-MCNC: 107 MG/DL (ref 74–99)
MAGNESIUM SERPL-MCNC: 2.3 MG/DL (ref 1.6–2.6)
MICROORGANISM SPEC CULT: NORMAL
MICROORGANISM SPEC CULT: NORMAL
PHOSPHATE SERPL-MCNC: 3.4 MG/DL (ref 2.5–4.5)
POTASSIUM SERPL-SCNC: 4.2 MMOL/L (ref 3.5–5)
SERVICE CMNT-IMP: NORMAL
SERVICE CMNT-IMP: NORMAL
SODIUM SERPL-SCNC: 135 MMOL/L (ref 132–146)
SPECIMEN DESCRIPTION: NORMAL
SPECIMEN DESCRIPTION: NORMAL

## 2024-12-20 PROCEDURE — 6370000000 HC RX 637 (ALT 250 FOR IP): Performed by: NURSE PRACTITIONER

## 2024-12-20 PROCEDURE — 99233 SBSQ HOSP IP/OBS HIGH 50: CPT | Performed by: INTERNAL MEDICINE

## 2024-12-20 PROCEDURE — 83735 ASSAY OF MAGNESIUM: CPT

## 2024-12-20 PROCEDURE — 6360000002 HC RX W HCPCS

## 2024-12-20 PROCEDURE — 6370000000 HC RX 637 (ALT 250 FOR IP)

## 2024-12-20 PROCEDURE — 6360000002 HC RX W HCPCS: Performed by: INTERNAL MEDICINE

## 2024-12-20 PROCEDURE — 6370000000 HC RX 637 (ALT 250 FOR IP): Performed by: INTERNAL MEDICINE

## 2024-12-20 PROCEDURE — 99232 SBSQ HOSP IP/OBS MODERATE 35: CPT | Performed by: INTERNAL MEDICINE

## 2024-12-20 PROCEDURE — 2060000000 HC ICU INTERMEDIATE R&B

## 2024-12-20 PROCEDURE — 2500000003 HC RX 250 WO HCPCS

## 2024-12-20 PROCEDURE — 80048 BASIC METABOLIC PNL TOTAL CA: CPT

## 2024-12-20 PROCEDURE — 84100 ASSAY OF PHOSPHORUS: CPT

## 2024-12-20 RX ORDER — METOLAZONE 5 MG/1
5 TABLET ORAL ONCE
Status: COMPLETED | OUTPATIENT
Start: 2024-12-20 | End: 2024-12-20

## 2024-12-20 RX ORDER — MIDODRINE HYDROCHLORIDE 5 MG/1
5 TABLET ORAL
Status: DISCONTINUED | OUTPATIENT
Start: 2024-12-20 | End: 2024-12-24 | Stop reason: HOSPADM

## 2024-12-20 RX ORDER — METOLAZONE 5 MG/1
5 TABLET ORAL DAILY
Status: DISCONTINUED | OUTPATIENT
Start: 2024-12-20 | End: 2024-12-20

## 2024-12-20 RX ADMIN — SODIUM CHLORIDE, PRESERVATIVE FREE 10 ML: 5 INJECTION INTRAVENOUS at 20:54

## 2024-12-20 RX ADMIN — IRON SUCROSE 200 MG: 20 INJECTION, SOLUTION INTRAVENOUS at 08:12

## 2024-12-20 RX ADMIN — FUROSEMIDE 40 MG: 10 INJECTION, SOLUTION INTRAMUSCULAR; INTRAVENOUS at 18:20

## 2024-12-20 RX ADMIN — METOPROLOL SUCCINATE 12.5 MG: 25 TABLET, EXTENDED RELEASE ORAL at 08:09

## 2024-12-20 RX ADMIN — DOCUSATE SODIUM 200 MG: 100 CAPSULE, LIQUID FILLED ORAL at 20:53

## 2024-12-20 RX ADMIN — EMPAGLIFLOZIN 10 MG: 10 TABLET, FILM COATED ORAL at 08:11

## 2024-12-20 RX ADMIN — FERROUS SULFATE TAB 325 MG (65 MG ELEMENTAL FE) 325 MG: 325 (65 FE) TAB at 08:10

## 2024-12-20 RX ADMIN — SODIUM CHLORIDE, PRESERVATIVE FREE 10 ML: 5 INJECTION INTRAVENOUS at 08:12

## 2024-12-20 RX ADMIN — METOLAZONE 5 MG: 5 TABLET ORAL at 13:00

## 2024-12-20 RX ADMIN — FUROSEMIDE 40 MG: 10 INJECTION, SOLUTION INTRAMUSCULAR; INTRAVENOUS at 08:10

## 2024-12-20 RX ADMIN — FAMOTIDINE 20 MG: 20 TABLET ORAL at 20:53

## 2024-12-20 RX ADMIN — Medication 1000 UNITS: at 20:54

## 2024-12-20 RX ADMIN — MIDODRINE HYDROCHLORIDE 2.5 MG: 5 TABLET ORAL at 08:08

## 2024-12-20 RX ADMIN — MIRTAZAPINE 15 MG: 15 TABLET, FILM COATED ORAL at 20:53

## 2024-12-20 RX ADMIN — AMIODARONE HYDROCHLORIDE 200 MG: 200 TABLET ORAL at 08:10

## 2024-12-20 RX ADMIN — PRAVASTATIN SODIUM 40 MG: 20 TABLET ORAL at 20:53

## 2024-12-20 RX ADMIN — MIDODRINE HYDROCHLORIDE 5 MG: 5 TABLET ORAL at 17:48

## 2024-12-20 RX ADMIN — FOLIC ACID 1 MG: 1 TABLET ORAL at 08:09

## 2024-12-20 RX ADMIN — CYANOCOBALAMIN TAB 1000 MCG 500 MCG: 1000 TAB at 08:10

## 2024-12-20 NOTE — CARE COORDINATION
Social Work/Discharge Planning:  Chart reviewed.  Patient pre-cert expires today.  Requested for [facility] to extend pre-cert.  Will continue to follow.  Electronically signed by FLORIAN Cortez on 12/20/2024 at 11:45 AM    Addendum:  [facility] Du[facility] states patient pre-cert is good until midnight tomorrow.  Will continue to follow.  Electronically signed by FLORIAN Cortez on 12/20/2024 at 4:05 PM

## 2024-12-21 LAB
ANION GAP SERPL CALCULATED.3IONS-SCNC: 10 MMOL/L (ref 7–16)
BUN SERPL-MCNC: 62 MG/DL (ref 6–23)
CALCIUM SERPL-MCNC: 8.7 MG/DL (ref 8.6–10.2)
CHLORIDE SERPL-SCNC: 95 MMOL/L (ref 98–107)
CO2 SERPL-SCNC: 30 MMOL/L (ref 22–29)
CREAT SERPL-MCNC: 2 MG/DL (ref 0.7–1.2)
GFR, ESTIMATED: 30 ML/MIN/1.73M2
GLUCOSE SERPL-MCNC: 169 MG/DL (ref 74–99)
PHOSPHATE SERPL-MCNC: 3.7 MG/DL (ref 2.5–4.5)
POTASSIUM SERPL-SCNC: 3.9 MMOL/L (ref 3.5–5)
SODIUM SERPL-SCNC: 135 MMOL/L (ref 132–146)

## 2024-12-21 PROCEDURE — 2060000000 HC ICU INTERMEDIATE R&B

## 2024-12-21 PROCEDURE — 99233 SBSQ HOSP IP/OBS HIGH 50: CPT | Performed by: INTERNAL MEDICINE

## 2024-12-21 PROCEDURE — 6370000000 HC RX 637 (ALT 250 FOR IP): Performed by: NURSE PRACTITIONER

## 2024-12-21 PROCEDURE — 99231 SBSQ HOSP IP/OBS SF/LOW 25: CPT | Performed by: INTERNAL MEDICINE

## 2024-12-21 PROCEDURE — 6370000000 HC RX 637 (ALT 250 FOR IP): Performed by: INTERNAL MEDICINE

## 2024-12-21 PROCEDURE — 2500000003 HC RX 250 WO HCPCS

## 2024-12-21 PROCEDURE — 6360000002 HC RX W HCPCS: Performed by: INTERNAL MEDICINE

## 2024-12-21 PROCEDURE — 80048 BASIC METABOLIC PNL TOTAL CA: CPT

## 2024-12-21 PROCEDURE — 6370000000 HC RX 637 (ALT 250 FOR IP)

## 2024-12-21 PROCEDURE — 84100 ASSAY OF PHOSPHORUS: CPT

## 2024-12-21 RX ORDER — FUROSEMIDE 10 MG/ML
40 INJECTION INTRAMUSCULAR; INTRAVENOUS DAILY
Status: DISCONTINUED | OUTPATIENT
Start: 2024-12-22 | End: 2024-12-22

## 2024-12-21 RX ADMIN — MIDODRINE HYDROCHLORIDE 5 MG: 5 TABLET ORAL at 09:05

## 2024-12-21 RX ADMIN — MIDODRINE HYDROCHLORIDE 5 MG: 5 TABLET ORAL at 17:22

## 2024-12-21 RX ADMIN — SODIUM CHLORIDE, PRESERVATIVE FREE 10 ML: 5 INJECTION INTRAVENOUS at 09:08

## 2024-12-21 RX ADMIN — FAMOTIDINE 20 MG: 20 TABLET ORAL at 21:18

## 2024-12-21 RX ADMIN — AMIODARONE HYDROCHLORIDE 200 MG: 200 TABLET ORAL at 11:07

## 2024-12-21 RX ADMIN — IRON SUCROSE 200 MG: 20 INJECTION, SOLUTION INTRAVENOUS at 09:07

## 2024-12-21 RX ADMIN — PRAVASTATIN SODIUM 40 MG: 20 TABLET ORAL at 21:17

## 2024-12-21 RX ADMIN — FOLIC ACID 1 MG: 1 TABLET ORAL at 09:05

## 2024-12-21 RX ADMIN — DOCUSATE SODIUM 200 MG: 100 CAPSULE, LIQUID FILLED ORAL at 21:18

## 2024-12-21 RX ADMIN — CYANOCOBALAMIN TAB 1000 MCG 500 MCG: 1000 TAB at 09:04

## 2024-12-21 RX ADMIN — Medication 1000 UNITS: at 21:17

## 2024-12-21 RX ADMIN — SODIUM CHLORIDE, PRESERVATIVE FREE 10 ML: 5 INJECTION INTRAVENOUS at 21:18

## 2024-12-21 RX ADMIN — FERROUS SULFATE TAB 325 MG (65 MG ELEMENTAL FE) 325 MG: 325 (65 FE) TAB at 09:05

## 2024-12-21 RX ADMIN — EMPAGLIFLOZIN 10 MG: 10 TABLET, FILM COATED ORAL at 09:05

## 2024-12-21 RX ADMIN — MIRTAZAPINE 15 MG: 15 TABLET, FILM COATED ORAL at 21:17

## 2024-12-21 RX ADMIN — MIDODRINE HYDROCHLORIDE 5 MG: 5 TABLET ORAL at 13:50

## 2024-12-22 LAB
ABO + RH BLD: NORMAL
ALBUMIN SERPL-MCNC: 3.1 G/DL (ref 3.5–5.2)
ALP SERPL-CCNC: 107 U/L (ref 40–129)
ALT SERPL-CCNC: 8 U/L (ref 0–40)
ANION GAP SERPL CALCULATED.3IONS-SCNC: 9 MMOL/L (ref 7–16)
ARM BAND NUMBER: NORMAL
AST SERPL-CCNC: 16 U/L (ref 0–39)
BASOPHILS # BLD: 0.02 K/UL (ref 0–0.2)
BASOPHILS NFR BLD: 0 % (ref 0–2)
BILIRUB SERPL-MCNC: 0.8 MG/DL (ref 0–1.2)
BLOOD BANK SAMPLE EXPIRATION: NORMAL
BLOOD GROUP ANTIBODIES SERPL: NEGATIVE
BUN SERPL-MCNC: 61 MG/DL (ref 6–23)
CALCIUM SERPL-MCNC: 8.7 MG/DL (ref 8.6–10.2)
CHLORIDE SERPL-SCNC: 96 MMOL/L (ref 98–107)
CO2 SERPL-SCNC: 29 MMOL/L (ref 22–29)
CREAT SERPL-MCNC: 1.9 MG/DL (ref 0.7–1.2)
EOSINOPHIL # BLD: 0.06 K/UL (ref 0.05–0.5)
EOSINOPHILS RELATIVE PERCENT: 1 % (ref 0–6)
ERYTHROCYTE [DISTWIDTH] IN BLOOD BY AUTOMATED COUNT: 15.4 % (ref 11.5–15)
GFR, ESTIMATED: 32 ML/MIN/1.73M2
GLUCOSE SERPL-MCNC: 131 MG/DL (ref 74–99)
HCT VFR BLD AUTO: 30.8 % (ref 37–54)
HGB BLD-MCNC: 9.8 G/DL (ref 12.5–16.5)
IMM GRANULOCYTES # BLD AUTO: 0.04 K/UL (ref 0–0.58)
IMM GRANULOCYTES NFR BLD: 1 % (ref 0–5)
LYMPHOCYTES NFR BLD: 0.7 K/UL (ref 1.5–4)
LYMPHOCYTES RELATIVE PERCENT: 8 % (ref 20–42)
MCH RBC QN AUTO: 30.7 PG (ref 26–35)
MCHC RBC AUTO-ENTMCNC: 31.8 G/DL (ref 32–34.5)
MCV RBC AUTO: 96.6 FL (ref 80–99.9)
MONOCYTES NFR BLD: 0.98 K/UL (ref 0.1–0.95)
MONOCYTES NFR BLD: 12 % (ref 2–12)
NEUTROPHILS NFR BLD: 79 % (ref 43–80)
NEUTS SEG NFR BLD: 6.69 K/UL (ref 1.8–7.3)
PHOSPHATE SERPL-MCNC: 3.6 MG/DL (ref 2.5–4.5)
PLATELET # BLD AUTO: 170 K/UL (ref 130–450)
PMV BLD AUTO: 10.8 FL (ref 7–12)
POTASSIUM SERPL-SCNC: 3.7 MMOL/L (ref 3.5–5)
PROT SERPL-MCNC: 6 G/DL (ref 6.4–8.3)
RBC # BLD AUTO: 3.19 M/UL (ref 3.8–5.8)
SODIUM SERPL-SCNC: 134 MMOL/L (ref 132–146)
WBC OTHER # BLD: 8.5 K/UL (ref 4.5–11.5)

## 2024-12-22 PROCEDURE — 6370000000 HC RX 637 (ALT 250 FOR IP): Performed by: INTERNAL MEDICINE

## 2024-12-22 PROCEDURE — 86850 RBC ANTIBODY SCREEN: CPT

## 2024-12-22 PROCEDURE — 99233 SBSQ HOSP IP/OBS HIGH 50: CPT | Performed by: INTERNAL MEDICINE

## 2024-12-22 PROCEDURE — 86901 BLOOD TYPING SEROLOGIC RH(D): CPT

## 2024-12-22 PROCEDURE — 6370000000 HC RX 637 (ALT 250 FOR IP)

## 2024-12-22 PROCEDURE — 6360000002 HC RX W HCPCS: Performed by: INTERNAL MEDICINE

## 2024-12-22 PROCEDURE — 2500000003 HC RX 250 WO HCPCS

## 2024-12-22 PROCEDURE — 80053 COMPREHEN METABOLIC PANEL: CPT

## 2024-12-22 PROCEDURE — 2060000000 HC ICU INTERMEDIATE R&B

## 2024-12-22 PROCEDURE — 86900 BLOOD TYPING SEROLOGIC ABO: CPT

## 2024-12-22 PROCEDURE — 85025 COMPLETE CBC W/AUTO DIFF WBC: CPT

## 2024-12-22 PROCEDURE — 6370000000 HC RX 637 (ALT 250 FOR IP): Performed by: NURSE PRACTITIONER

## 2024-12-22 PROCEDURE — 84100 ASSAY OF PHOSPHORUS: CPT

## 2024-12-22 RX ORDER — BUMETANIDE 1 MG/1
1 TABLET ORAL DAILY
Status: DISCONTINUED | OUTPATIENT
Start: 2024-12-23 | End: 2024-12-24 | Stop reason: HOSPADM

## 2024-12-22 RX ADMIN — IRON SUCROSE 200 MG: 20 INJECTION, SOLUTION INTRAVENOUS at 09:21

## 2024-12-22 RX ADMIN — MIDODRINE HYDROCHLORIDE 5 MG: 5 TABLET ORAL at 17:09

## 2024-12-22 RX ADMIN — FAMOTIDINE 20 MG: 20 TABLET ORAL at 19:50

## 2024-12-22 RX ADMIN — DOCUSATE SODIUM 200 MG: 100 CAPSULE, LIQUID FILLED ORAL at 19:51

## 2024-12-22 RX ADMIN — SODIUM CHLORIDE, PRESERVATIVE FREE 10 ML: 5 INJECTION INTRAVENOUS at 19:51

## 2024-12-22 RX ADMIN — MIRTAZAPINE 15 MG: 15 TABLET, FILM COATED ORAL at 19:50

## 2024-12-22 RX ADMIN — FOLIC ACID 1 MG: 1 TABLET ORAL at 09:20

## 2024-12-22 RX ADMIN — EMPAGLIFLOZIN 10 MG: 10 TABLET, FILM COATED ORAL at 09:20

## 2024-12-22 RX ADMIN — FERROUS SULFATE TAB 325 MG (65 MG ELEMENTAL FE) 325 MG: 325 (65 FE) TAB at 09:20

## 2024-12-22 RX ADMIN — CYANOCOBALAMIN TAB 1000 MCG 500 MCG: 1000 TAB at 09:20

## 2024-12-22 RX ADMIN — METOPROLOL SUCCINATE 12.5 MG: 25 TABLET, EXTENDED RELEASE ORAL at 09:20

## 2024-12-22 RX ADMIN — MIDODRINE HYDROCHLORIDE 5 MG: 5 TABLET ORAL at 13:32

## 2024-12-22 RX ADMIN — AMIODARONE HYDROCHLORIDE 200 MG: 200 TABLET ORAL at 09:19

## 2024-12-22 RX ADMIN — PRAVASTATIN SODIUM 40 MG: 20 TABLET ORAL at 19:51

## 2024-12-22 RX ADMIN — SODIUM CHLORIDE, PRESERVATIVE FREE 10 ML: 5 INJECTION INTRAVENOUS at 09:22

## 2024-12-22 RX ADMIN — MIDODRINE HYDROCHLORIDE 5 MG: 5 TABLET ORAL at 09:20

## 2024-12-22 RX ADMIN — Medication 1000 UNITS: at 19:50

## 2024-12-22 ASSESSMENT — PAIN SCALES - GENERAL: PAINLEVEL_OUTOF10: 0

## 2024-12-23 ENCOUNTER — APPOINTMENT (OUTPATIENT)
Dept: CT IMAGING | Age: 89
DRG: 291 | End: 2024-12-23
Payer: MEDICARE

## 2024-12-23 LAB
ANION GAP SERPL CALCULATED.3IONS-SCNC: 12 MMOL/L (ref 7–16)
BILIRUB UR QL STRIP: NEGATIVE
BUN SERPL-MCNC: 66 MG/DL (ref 6–23)
CALCIUM SERPL-MCNC: 8.7 MG/DL (ref 8.6–10.2)
CHLORIDE SERPL-SCNC: 95 MMOL/L (ref 98–107)
CLARITY UR: CLEAR
CO2 SERPL-SCNC: 27 MMOL/L (ref 22–29)
COLOR UR: YELLOW
CREAT SERPL-MCNC: 1.9 MG/DL (ref 0.7–1.2)
GFR, ESTIMATED: 32 ML/MIN/1.73M2
GLUCOSE SERPL-MCNC: 112 MG/DL (ref 74–99)
GLUCOSE UR STRIP-MCNC: 500 MG/DL
HGB UR QL STRIP.AUTO: ABNORMAL
KETONES UR STRIP-MCNC: NEGATIVE MG/DL
LEUKOCYTE ESTERASE UR QL STRIP: NEGATIVE
NITRITE UR QL STRIP: NEGATIVE
PH UR STRIP: 5.5 [PH] (ref 5–9)
PHOSPHATE SERPL-MCNC: 3.5 MG/DL (ref 2.5–4.5)
POTASSIUM SERPL-SCNC: 3.7 MMOL/L (ref 3.5–5)
PROT UR STRIP-MCNC: NEGATIVE MG/DL
RBC #/AREA URNS HPF: ABNORMAL /HPF
SODIUM SERPL-SCNC: 134 MMOL/L (ref 132–146)
SP GR UR STRIP: 1.01 (ref 1–1.03)
UROBILINOGEN UR STRIP-ACNC: 0.2 EU/DL (ref 0–1)
WBC #/AREA URNS HPF: ABNORMAL /HPF

## 2024-12-23 PROCEDURE — 2500000003 HC RX 250 WO HCPCS

## 2024-12-23 PROCEDURE — 81001 URINALYSIS AUTO W/SCOPE: CPT

## 2024-12-23 PROCEDURE — 2060000000 HC ICU INTERMEDIATE R&B

## 2024-12-23 PROCEDURE — 99232 SBSQ HOSP IP/OBS MODERATE 35: CPT | Performed by: STUDENT IN AN ORGANIZED HEALTH CARE EDUCATION/TRAINING PROGRAM

## 2024-12-23 PROCEDURE — 70450 CT HEAD/BRAIN W/O DYE: CPT

## 2024-12-23 PROCEDURE — 6370000000 HC RX 637 (ALT 250 FOR IP): Performed by: NURSE PRACTITIONER

## 2024-12-23 PROCEDURE — 80048 BASIC METABOLIC PNL TOTAL CA: CPT

## 2024-12-23 PROCEDURE — 36415 COLL VENOUS BLD VENIPUNCTURE: CPT

## 2024-12-23 PROCEDURE — 6370000000 HC RX 637 (ALT 250 FOR IP)

## 2024-12-23 PROCEDURE — 6370000000 HC RX 637 (ALT 250 FOR IP): Performed by: INTERNAL MEDICINE

## 2024-12-23 PROCEDURE — 97530 THERAPEUTIC ACTIVITIES: CPT

## 2024-12-23 PROCEDURE — 6370000000 HC RX 637 (ALT 250 FOR IP): Performed by: STUDENT IN AN ORGANIZED HEALTH CARE EDUCATION/TRAINING PROGRAM

## 2024-12-23 PROCEDURE — 84100 ASSAY OF PHOSPHORUS: CPT

## 2024-12-23 RX ADMIN — FAMOTIDINE 20 MG: 20 TABLET ORAL at 20:18

## 2024-12-23 RX ADMIN — MIDODRINE HYDROCHLORIDE 5 MG: 5 TABLET ORAL at 16:24

## 2024-12-23 RX ADMIN — MIRTAZAPINE 15 MG: 15 TABLET, FILM COATED ORAL at 20:18

## 2024-12-23 RX ADMIN — Medication 1000 UNITS: at 20:18

## 2024-12-23 RX ADMIN — METOPROLOL SUCCINATE 12.5 MG: 25 TABLET, EXTENDED RELEASE ORAL at 07:58

## 2024-12-23 RX ADMIN — MIDODRINE HYDROCHLORIDE 5 MG: 5 TABLET ORAL at 11:16

## 2024-12-23 RX ADMIN — SODIUM CHLORIDE, PRESERVATIVE FREE 10 ML: 5 INJECTION INTRAVENOUS at 20:19

## 2024-12-23 RX ADMIN — DOCUSATE SODIUM 200 MG: 100 CAPSULE, LIQUID FILLED ORAL at 20:18

## 2024-12-23 RX ADMIN — FOLIC ACID 1 MG: 1 TABLET ORAL at 07:59

## 2024-12-23 RX ADMIN — SODIUM CHLORIDE, PRESERVATIVE FREE 10 ML: 5 INJECTION INTRAVENOUS at 07:58

## 2024-12-23 RX ADMIN — EMPAGLIFLOZIN 10 MG: 10 TABLET, FILM COATED ORAL at 07:59

## 2024-12-23 RX ADMIN — FERROUS SULFATE TAB 325 MG (65 MG ELEMENTAL FE) 325 MG: 325 (65 FE) TAB at 07:58

## 2024-12-23 RX ADMIN — CYANOCOBALAMIN TAB 1000 MCG 500 MCG: 1000 TAB at 07:58

## 2024-12-23 RX ADMIN — PRAVASTATIN SODIUM 40 MG: 20 TABLET ORAL at 20:20

## 2024-12-23 RX ADMIN — BUMETANIDE 1 MG: 1 TABLET ORAL at 07:58

## 2024-12-23 RX ADMIN — MIDODRINE HYDROCHLORIDE 5 MG: 5 TABLET ORAL at 07:58

## 2024-12-23 RX ADMIN — AMIODARONE HYDROCHLORIDE 200 MG: 200 TABLET ORAL at 07:58

## 2024-12-23 ASSESSMENT — PAIN SCALES - GENERAL
PAINLEVEL_OUTOF10: 0

## 2024-12-23 NOTE — CARE COORDINATION
Social Work/Discharge Planning:  Chart reviewed.  Patient pre-cert  .  Called liaison [facility] Duong and left a message for facility to start pre-cert.  Will continue to follow and wait for pre-cert.  Electronically signed by FLORIAN Cortez on 2024 at 11:52 AM    Addendum:  [facility] called with pre-cert approval.  Pre-cert is good until .  Updated charge nurse.  Will continue to follow.  Electronically signed by FLORIAN Cortez on 2024 at 1:14 PM  '

## 2024-12-24 VITALS
RESPIRATION RATE: 18 BRPM | TEMPERATURE: 97.5 F | SYSTOLIC BLOOD PRESSURE: 104 MMHG | HEART RATE: 81 BPM | HEIGHT: 69 IN | DIASTOLIC BLOOD PRESSURE: 58 MMHG | BODY MASS INDEX: 29.55 KG/M2 | OXYGEN SATURATION: 91 % | WEIGHT: 199.52 LBS

## 2024-12-24 LAB
ANION GAP SERPL CALCULATED.3IONS-SCNC: 9 MMOL/L (ref 7–16)
BUN SERPL-MCNC: 72 MG/DL (ref 6–23)
CALCIUM SERPL-MCNC: 8.7 MG/DL (ref 8.6–10.2)
CHLORIDE SERPL-SCNC: 96 MMOL/L (ref 98–107)
CO2 SERPL-SCNC: 30 MMOL/L (ref 22–29)
CREAT SERPL-MCNC: 1.9 MG/DL (ref 0.7–1.2)
ERYTHROCYTE [DISTWIDTH] IN BLOOD BY AUTOMATED COUNT: 15.5 % (ref 11.5–15)
GFR, ESTIMATED: 33 ML/MIN/1.73M2
GLUCOSE SERPL-MCNC: 114 MG/DL (ref 74–99)
HCT VFR BLD AUTO: 30.9 % (ref 37–54)
HGB BLD-MCNC: 9.9 G/DL (ref 12.5–16.5)
MAGNESIUM SERPL-MCNC: 2.5 MG/DL (ref 1.6–2.6)
MCH RBC QN AUTO: 30.7 PG (ref 26–35)
MCHC RBC AUTO-ENTMCNC: 32 G/DL (ref 32–34.5)
MCV RBC AUTO: 96 FL (ref 80–99.9)
PHOSPHATE SERPL-MCNC: 3.2 MG/DL (ref 2.5–4.5)
PLATELET # BLD AUTO: 177 K/UL (ref 130–450)
PMV BLD AUTO: 10.1 FL (ref 7–12)
POTASSIUM SERPL-SCNC: 3.4 MMOL/L (ref 3.5–5)
RBC # BLD AUTO: 3.22 M/UL (ref 3.8–5.8)
SODIUM SERPL-SCNC: 135 MMOL/L (ref 132–146)
WBC OTHER # BLD: 7.4 K/UL (ref 4.5–11.5)

## 2024-12-24 PROCEDURE — 99239 HOSP IP/OBS DSCHRG MGMT >30: CPT | Performed by: STUDENT IN AN ORGANIZED HEALTH CARE EDUCATION/TRAINING PROGRAM

## 2024-12-24 PROCEDURE — 83735 ASSAY OF MAGNESIUM: CPT

## 2024-12-24 PROCEDURE — 6370000000 HC RX 637 (ALT 250 FOR IP)

## 2024-12-24 PROCEDURE — 36415 COLL VENOUS BLD VENIPUNCTURE: CPT

## 2024-12-24 PROCEDURE — 2500000003 HC RX 250 WO HCPCS

## 2024-12-24 PROCEDURE — 6370000000 HC RX 637 (ALT 250 FOR IP): Performed by: NURSE PRACTITIONER

## 2024-12-24 PROCEDURE — 51701 INSERT BLADDER CATHETER: CPT

## 2024-12-24 PROCEDURE — 51798 US URINE CAPACITY MEASURE: CPT

## 2024-12-24 PROCEDURE — 84100 ASSAY OF PHOSPHORUS: CPT

## 2024-12-24 PROCEDURE — 85027 COMPLETE CBC AUTOMATED: CPT

## 2024-12-24 PROCEDURE — 6370000000 HC RX 637 (ALT 250 FOR IP): Performed by: STUDENT IN AN ORGANIZED HEALTH CARE EDUCATION/TRAINING PROGRAM

## 2024-12-24 PROCEDURE — 80048 BASIC METABOLIC PNL TOTAL CA: CPT

## 2024-12-24 PROCEDURE — 6370000000 HC RX 637 (ALT 250 FOR IP): Performed by: INTERNAL MEDICINE

## 2024-12-24 RX ORDER — BUMETANIDE 1 MG/1
1 TABLET ORAL DAILY
Qty: 30 TABLET | Refills: 3 | Status: SHIPPED | OUTPATIENT
Start: 2024-12-25

## 2024-12-24 RX ORDER — AMIODARONE HYDROCHLORIDE 200 MG/1
200 TABLET ORAL DAILY
Qty: 30 TABLET | Refills: 3 | Status: SHIPPED | OUTPATIENT
Start: 2024-12-25 | End: 2025-04-24

## 2024-12-24 RX ORDER — MIDODRINE HYDROCHLORIDE 5 MG/1
5 TABLET ORAL
Qty: 90 TABLET | Refills: 3 | Status: SHIPPED | OUTPATIENT
Start: 2024-12-24

## 2024-12-24 RX ORDER — METOPROLOL SUCCINATE 25 MG/1
12.5 TABLET, EXTENDED RELEASE ORAL 2 TIMES DAILY
Qty: 30 TABLET | Refills: 3 | Status: SHIPPED | OUTPATIENT
Start: 2024-12-24

## 2024-12-24 RX ORDER — POTASSIUM CHLORIDE 1500 MG/1
20 TABLET, EXTENDED RELEASE ORAL ONCE
Status: COMPLETED | OUTPATIENT
Start: 2024-12-24 | End: 2024-12-24

## 2024-12-24 RX ADMIN — MIDODRINE HYDROCHLORIDE 5 MG: 5 TABLET ORAL at 07:42

## 2024-12-24 RX ADMIN — SODIUM CHLORIDE, PRESERVATIVE FREE 10 ML: 5 INJECTION INTRAVENOUS at 07:43

## 2024-12-24 RX ADMIN — METOPROLOL SUCCINATE 12.5 MG: 25 TABLET, EXTENDED RELEASE ORAL at 07:42

## 2024-12-24 RX ADMIN — AMIODARONE HYDROCHLORIDE 200 MG: 200 TABLET ORAL at 07:42

## 2024-12-24 RX ADMIN — BUMETANIDE 1 MG: 1 TABLET ORAL at 07:42

## 2024-12-24 RX ADMIN — FOLIC ACID 1 MG: 1 TABLET ORAL at 07:42

## 2024-12-24 RX ADMIN — CYANOCOBALAMIN TAB 1000 MCG 500 MCG: 1000 TAB at 07:42

## 2024-12-24 RX ADMIN — EMPAGLIFLOZIN 10 MG: 10 TABLET, FILM COATED ORAL at 07:42

## 2024-12-24 RX ADMIN — POTASSIUM CHLORIDE 20 MEQ: 1500 TABLET, EXTENDED RELEASE ORAL at 08:25

## 2024-12-24 RX ADMIN — FERROUS SULFATE TAB 325 MG (65 MG ELEMENTAL FE) 325 MG: 325 (65 FE) TAB at 07:42

## 2024-12-24 ASSESSMENT — PAIN SCALES - GENERAL
PAINLEVEL_OUTOF10: 0
PAINLEVEL_OUTOF10: 0

## 2024-12-24 NOTE — PROGRESS NOTES
[facility] Hospitalist Progress Note    Admitting Date and Time: 12/15/2024  4:08 PM  Admit Dx: Hyperkalemia [E87.5]  Pleural effusion [J90]  Pleural effusion on left [J90]  Acute on chronic systolic congestive heart failure (HCC) [I50.23]  Acute cystitis without hematuria [N30.00]  Chronic kidney disease, unspecified CKD stage [N18.9]  Acute on chronic congestive heart failure, unspecified heart failure type (HCC) [I50.9]    Subjective:  Patient is being followed for Hyperkalemia [E87.5]  Pleural effusion [J90]  Pleural effusion on left [J90]  Acute on chronic systolic congestive heart failure (HCC) [I50.23]  Acute cystitis without hematuria [N30.00]  Chronic kidney disease, unspecified CKD stage [N18.9]  Acute on chronic congestive heart failure, unspecified heart failure type (HCC) [I50.9]     Patient is laying in bed comfortably.  No acute this morning.    ROS: denies fever, chills, cp, sob, n/v, HA unless stated above.      HYDROmorphone  0.25 mg IntraVENous Once    lidocaine  1 patch TransDERmal Daily    metoprolol succinate  12.5 mg Oral BID    amiodarone  200 mg Oral Daily    iron sucrose  200 mg IntraVENous Daily    midodrine  2.5 mg Oral TID WC    sodium chloride flush  5-40 mL IntraVENous 2 times per day    furosemide  40 mg IntraVENous BID    apixaban  2.5 mg Oral BID    cyanocobalamin  500 mcg Oral Daily    docusate sodium  200 mg Oral Nightly    famotidine  20 mg Oral Daily    ferrous sulfate  325 mg Oral Daily with breakfast    folic acid  1 mg Oral Daily    mirtazapine  15 mg Oral Nightly    pravastatin  40 mg Oral Nightly    Vitamin D  1,000 Units Oral Daily     sodium chloride flush, 5-40 mL, PRN  sodium chloride, , PRN  polyethylene glycol, 17 g, Daily PRN  acetaminophen, 650 mg, Q6H PRN   Or  acetaminophen, 650 mg, Q6H PRN  sulfur hexafluoride microspheres, 2 mL, ONCE PRN         Objective:    /70   Pulse 69   Temp 98.1 °F (36.7 °C) (Oral)   Resp 18   Ht 1.753 m (5' 9\")   Wt 
       [facility] Hospitalist Progress Note    Admitting Date and Time: 12/15/2024  4:08 PM  Admit Dx: Hyperkalemia [E87.5]  Pleural effusion [J90]  Pleural effusion on left [J90]  Acute on chronic systolic congestive heart failure (HCC) [I50.23]  Acute cystitis without hematuria [N30.00]  Chronic kidney disease, unspecified CKD stage [N18.9]  Acute on chronic congestive heart failure, unspecified heart failure type (HCC) [I50.9]    Subjective:  Patient is being followed for Hyperkalemia [E87.5]  Pleural effusion [J90]  Pleural effusion on left [J90]  Acute on chronic systolic congestive heart failure (HCC) [I50.23]  Acute cystitis without hematuria [N30.00]  Chronic kidney disease, unspecified CKD stage [N18.9]  Acute on chronic congestive heart failure, unspecified heart failure type (HCC) [I50.9]     Patient complains of intermittent back pain radiating toward anterior thorax.  He is not currently in pain.    ROS: denies fever, chills, cp, sob, n/v, HA unless stated above.      sodium chloride flush  5-40 mL IntraVENous 2 times per day    furosemide  40 mg IntraVENous BID    [Held by provider] apixaban  2.5 mg Oral BID    cyanocobalamin  500 mcg Oral Daily    docusate sodium  200 mg Oral Nightly    famotidine  20 mg Oral Daily    ferrous sulfate  325 mg Oral Daily with breakfast    folic acid  1 mg Oral Daily    metoprolol tartrate  12.5 mg Oral BID    mirtazapine  15 mg Oral Nightly    pravastatin  40 mg Oral Nightly    Vitamin D  1,000 Units Oral Daily    sodium zirconium cyclosilicate  10 g Oral TID     sodium chloride flush, 5-40 mL, PRN  sodium chloride, , PRN  polyethylene glycol, 17 g, Daily PRN  acetaminophen, 650 mg, Q6H PRN   Or  acetaminophen, 650 mg, Q6H PRN  sulfur hexafluoride microspheres, 2 mL, ONCE PRN         Objective:    /64   Pulse 75   Temp 97.8 °F (36.6 °C) (Oral)   Resp 18   Ht 1.753 m (5' 9\")   Wt 92.9 kg (204 lb 12.9 oz)   SpO2 94%   BMI 30.24 kg/m²     General Appearance: 
       [facility] Hospitalist Progress Note    Admitting Date and Time: 12/15/2024  4:08 PM  Admit Dx: Hyperkalemia [E87.5]  Pleural effusion [J90]  Pleural effusion on left [J90]  Acute on chronic systolic congestive heart failure (HCC) [I50.23]  Acute cystitis without hematuria [N30.00]  Chronic kidney disease, unspecified CKD stage [N18.9]  Acute on chronic congestive heart failure, unspecified heart failure type (HCC) [I50.9]    Subjective:  Patient is being followed for Hyperkalemia [E87.5]  Pleural effusion [J90]  Pleural effusion on left [J90]  Acute on chronic systolic congestive heart failure (HCC) [I50.23]  Acute cystitis without hematuria [N30.00]  Chronic kidney disease, unspecified CKD stage [N18.9]  Acute on chronic congestive heart failure, unspecified heart failure type (HCC) [I50.9]   Pt poor historian.   Per RN:  No major concerns.  ROS: denies fever, chills, cp, sob, n/v, HA unless stated above.      bumetanide  1 mg Oral Daily    midodrine  5 mg Oral TID WC    empagliflozin  10 mg Oral Daily    HYDROmorphone  0.25 mg IntraVENous Once    lidocaine  1 patch TransDERmal Daily    metoprolol succinate  12.5 mg Oral BID    amiodarone  200 mg Oral Daily    sodium chloride flush  5-40 mL IntraVENous 2 times per day    [Held by provider] apixaban  2.5 mg Oral BID    cyanocobalamin  500 mcg Oral Daily    docusate sodium  200 mg Oral Nightly    famotidine  20 mg Oral Daily    ferrous sulfate  325 mg Oral Daily with breakfast    folic acid  1 mg Oral Daily    mirtazapine  15 mg Oral Nightly    pravastatin  40 mg Oral Nightly    Vitamin D  1,000 Units Oral Daily     sodium chloride flush, 5-40 mL, PRN  sodium chloride, , PRN  polyethylene glycol, 17 g, Daily PRN  acetaminophen, 650 mg, Q6H PRN   Or  acetaminophen, 650 mg, Q6H PRN  sulfur hexafluoride microspheres, 2 mL, ONCE PRN         Objective:    BP 99/60   Pulse 78   Temp 97.8 °F (36.6 °C) (Axillary)   Resp 16   Ht 1.753 m (5' 9\")   Wt 90.5 kg 
     Nutrition Note     Reason for Visit:    Reassess    Nutrition Assessment:  Discharge plan in progress for today to [facility]. Pt continues to eat >50% at meals on average. Recommend continue current Carb Controlled diet and Wound Healing ONS to promote glycemic control and healing of DTI heel.      Current Nutrition Therapies:    ADULT DIET; Regular; 4 carb choices (60 gm/meal); No Added Salt (3-4 gm)  ADULT ORAL NUTRITION SUPPLEMENT; Lunch, Dinner; Wound Healing Oral Supplement  ADULT ORAL NUTRITION SUPPLEMENT; Breakfast; Low Calorie/High Protein Oral Supplement    Anthropometrics:   Current Height: 175.3 cm (5' 9\")  Current Weight - Scale: 90.5 kg (199 lb 8.3 oz)      Monitoring and Evaluation:  Patient will be monitored per nutrition standards of care.     Consult Dietitian if nutrition intervention essential to patient care is needed.     Discharge Planning:  Recommend continue wound healing ONS and Carb Controlled diet.    Geetha Samano RD, CNSC, LD     
    12/20/2024 10:44 AM  Young Watkins  [id number]    Subjective:      Lying in bed  Denies pain with catheter  States he was recently irrigated      Review of Systems  Constitutional: No fever or chills   Respiratory: negative for cough and hemoptysis  Cardiovascular: negative for chest pain and dyspnea  Gastrointestinal: negative for abdominal pain, diarrhea, nausea and vomiting   : See above  Derm: negative for rash and skin lesion(s)  Neurological: negative for seizures and tremors  Musculoskeletal: Negative    Psychiatric: Negative   All other reviews are negative      Scheduled Meds:   empagliflozin  10 mg Oral Daily    HYDROmorphone  0.25 mg IntraVENous Once    lidocaine  1 patch TransDERmal Daily    metoprolol succinate  12.5 mg Oral BID    amiodarone  200 mg Oral Daily    iron sucrose  200 mg IntraVENous Daily    midodrine  2.5 mg Oral TID WC    sodium chloride flush  5-40 mL IntraVENous 2 times per day    furosemide  40 mg IntraVENous BID    [Held by provider] apixaban  2.5 mg Oral BID    cyanocobalamin  500 mcg Oral Daily    docusate sodium  200 mg Oral Nightly    famotidine  20 mg Oral Daily    ferrous sulfate  325 mg Oral Daily with breakfast    folic acid  1 mg Oral Daily    mirtazapine  15 mg Oral Nightly    pravastatin  40 mg Oral Nightly    Vitamin D  1,000 Units Oral Daily       Objective:  Vitals:    12/20/24 0730   BP: 104/64   Pulse: 75   Resp: 18   Temp: 97.5 °F (36.4 °C)   SpO2: 96%         Allergies: Axid [nizatidine], Indocin [indomethacin], Klor-con [potassium chloride], Levaquin [levofloxacin], and Ranexa [ranolazine]    General Appearance: alert and oriented to person, place and time and in no acute distress  Skin: no rash or erythema  Head: normocephalic and atraumatic  Pulmonary/Chest: normal air movement, no respiratory distress  Abdomen: soft, non-tender, non-distended  Genitourinary: Bingham in place, CBI running at a very slow rate, no evidence of hematuria  Extremities: no cyanosis, 
    12/21/2024 12:19 PM  Young Watkins  [id number]    Subjective: Patient has continued off CBI overnight.  He has had a couple small clots irrigated.  Urine is very light pink this morning.      Scheduled Meds:   midodrine  5 mg Oral TID WC    empagliflozin  10 mg Oral Daily    HYDROmorphone  0.25 mg IntraVENous Once    lidocaine  1 patch TransDERmal Daily    metoprolol succinate  12.5 mg Oral BID    amiodarone  200 mg Oral Daily    iron sucrose  200 mg IntraVENous Daily    sodium chloride flush  5-40 mL IntraVENous 2 times per day    furosemide  40 mg IntraVENous BID    [Held by provider] apixaban  2.5 mg Oral BID    cyanocobalamin  500 mcg Oral Daily    docusate sodium  200 mg Oral Nightly    famotidine  20 mg Oral Daily    ferrous sulfate  325 mg Oral Daily with breakfast    folic acid  1 mg Oral Daily    mirtazapine  15 mg Oral Nightly    pravastatin  40 mg Oral Nightly    Vitamin D  1,000 Units Oral Daily       Objective:  Vitals:    12/21/24 1147   BP: 101/77   Pulse: (!) 156   Resp: 18   Temp: 98.1 °F (36.7 °C)   SpO2:          Allergies: Axid [nizatidine], Indocin [indomethacin], Klor-con [potassium chloride], Levaquin [levofloxacin], and Ranexa [ranolazine]    General Appearance: alert and oriented to person, place and time and in no acute distress  Skin: no rash or erythema  Head: normocephalic and atraumatic  Pulmonary/Chest: normal air movement, no respiratory distress  Abdomen: soft, non-tender, non-distended  Genitourinary: Three-way Bingham in place draining light pink urine.  Extremities: no cyanosis, clubbing or edema         Labs:     Recent Labs     12/20/24  0155      K 4.2   CL 99   CO2 27   BUN 60*   CREATININE 1.8*   GLUCOSE 107*   CALCIUM 8.3*       Lab Results   Component Value Date/Time    HGB 9.7 12/19/2024 01:35 AM    HCT 31.1 12/19/2024 01:35 AM       No results found for: \"PSA\"      Assessment/Plan:  Gross hematuria  History of prostate cancer.    Continue three-way Bingham in place for 
    12/22/2024 11:30 AM  Young Watkins  [id number]    Subjective: Patient resting in bed comfortably.  Bingham draining very light pink urine with CBI clamped.      Scheduled Meds:   [START ON 12/23/2024] bumetanide  1 mg Oral Daily    midodrine  5 mg Oral TID WC    empagliflozin  10 mg Oral Daily    HYDROmorphone  0.25 mg IntraVENous Once    lidocaine  1 patch TransDERmal Daily    metoprolol succinate  12.5 mg Oral BID    amiodarone  200 mg Oral Daily    sodium chloride flush  5-40 mL IntraVENous 2 times per day    [Held by provider] apixaban  2.5 mg Oral BID    cyanocobalamin  500 mcg Oral Daily    docusate sodium  200 mg Oral Nightly    famotidine  20 mg Oral Daily    ferrous sulfate  325 mg Oral Daily with breakfast    folic acid  1 mg Oral Daily    mirtazapine  15 mg Oral Nightly    pravastatin  40 mg Oral Nightly    Vitamin D  1,000 Units Oral Daily       Objective:  Vitals:    12/22/24 1122   BP: 111/73   Pulse: 78   Resp: 18   Temp: 97.3 °F (36.3 °C)   SpO2:          Allergies: Axid [nizatidine], Indocin [indomethacin], Klor-con [potassium chloride], Levaquin [levofloxacin], and Ranexa [ranolazine]    General Appearance: alert and oriented to person, place and time and in no acute distress  Skin: no rash or erythema  Head: normocephalic and atraumatic  Pulmonary/Chest: normal air movement, no respiratory distress  Abdomen: soft, non-tender, non-distended  Genitourinary: As above in subjective  Extremities: no cyanosis, clubbing or edema         Labs:     Recent Labs     12/22/24  0410      K 3.7   CL 96*   CO2 29   BUN 61*   CREATININE 1.9*   GLUCOSE 131*   CALCIUM 8.7       Lab Results   Component Value Date/Time    HGB 9.8 12/22/2024 04:10 AM    HCT 30.8 12/22/2024 04:10 AM       No results found for: \"PSA\"      Assessment/Plan:  Gross hematuria  History of prostate cancer.     Continue three-way Bingham in place for now.  CBI can continue clamped.  We will observe over the weekend and determine if patient 
    12/23/2024 4:30 PM  Young Watkins  [id number]    Subjective:    He is lying in bed  Nursing is present at the bedside  CBI has been clamped, urine is clear yellow    Review of Systems  Constitutional: No fever or chills   Respiratory: negative for cough and hemoptysis  Cardiovascular: negative for chest pain and dyspnea  Gastrointestinal: negative for abdominal pain, diarrhea, nausea and vomiting   : See above  Derm: negative for rash and skin lesion(s)  Neurological: negative for seizures and tremors  Musculoskeletal: Negative    Psychiatric: Negative   All other reviews are negative      Scheduled Meds:   bumetanide  1 mg Oral Daily    midodrine  5 mg Oral TID WC    empagliflozin  10 mg Oral Daily    HYDROmorphone  0.25 mg IntraVENous Once    lidocaine  1 patch TransDERmal Daily    metoprolol succinate  12.5 mg Oral BID    amiodarone  200 mg Oral Daily    sodium chloride flush  5-40 mL IntraVENous 2 times per day    [Held by provider] apixaban  2.5 mg Oral BID    cyanocobalamin  500 mcg Oral Daily    docusate sodium  200 mg Oral Nightly    famotidine  20 mg Oral Daily    ferrous sulfate  325 mg Oral Daily with breakfast    folic acid  1 mg Oral Daily    mirtazapine  15 mg Oral Nightly    pravastatin  40 mg Oral Nightly    Vitamin D  1,000 Units Oral Daily       Objective:  Vitals:    12/23/24 1608   BP: 98/61   Pulse: 82   Resp: 16   Temp: 97.5 °F (36.4 °C)   SpO2: 97%         Allergies: Axid [nizatidine], Indocin [indomethacin], Klor-con [potassium chloride], Levaquin [levofloxacin], and Ranexa [ranolazine]    General Appearance: alert and oriented to person, place and time and in no acute distress  Skin: no rash or erythema  Head: normocephalic and atraumatic  Pulmonary/Chest: normal air movement, no respiratory distress  Abdomen: soft, non-tender, non-distended  Genitourinary: Bingham draining clear yellow urine  Extremities: no cyanosis, clubbing or edema         Labs:     Recent Labs     12/23/24  0500   NA 
    INPATIENT CARDIOLOGY FOLLOW-UP    Name: Young Watkins    Age: 89 y.o.    Date of Admission: 12/15/2024  4:08 PM    Date of Service: 12/18/2024    Primary Cardiologist: Known to me from prior admission.  Follows with [facility]    Chief Complaint: Follow-up for decompensated heart failure    Interim History:  No new overnight cardiac complaints. Currently with no complaints of CP, SOB, palpitations, dizziness, or lightheadedness.     1 L negative so far.  Feeling somewhat better.  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    Ischemic cardiomyopathy    VHD (valvular heart disease)    Acute on chronic heart failure, unspecified heart failure type (HCC)    Pleural effusion on left    Acute cystitis with hematuria    Chronic kidney disease    NICM (nonischemic cardiomyopathy) ([facility])    Chest wall pain       Current Medications:    Current Facility-Administered Medications:     HYDROmorphone (DILAUDID) injection 0.25 mg, 0.25 mg, IntraVENous, Once, Juan Land MD    lidocaine 4 % external patch 1 patch, 1 patch, TransDERmal, Daily, Juan Land MD, 1 patch at 12/17/24 1612    metoprolol succinate (TOPROL XL) extended release tablet 12.5 mg, 12.5 mg, Oral, BID, Dannielle Nagel APRN - CNP    amiodarone (CORDARONE) tablet 200 mg, 200 mg, Oral, Daily, Dannielle Nagel APRN - CNP, 200 mg at 12/17/24 1612    iron sucrose (VENOFER) injection 200 mg, 200 mg, IntraVENous, Daily, Ian Bennett MD    midodrine (PROAMATINE) tablet 2.5 mg, 2.5 mg, Oral, TID STEPHANIE, Jaden 
    INPATIENT CARDIOLOGY FOLLOW-UP    Name: Young Watkins    Age: 89 y.o.    Date of Admission: 12/15/2024  4:08 PM    Date of Service: 12/19/2024    Primary Cardiologist: Known to me from prior admission.  Follows with [facility]    Chief Complaint: Follow-up for decompensated heart failure    Interim History:  No new overnight cardiac complaints. Currently with no complaints of CP, SOB, palpitations, dizziness, or lightheadedness.     5 L negative so far.  Feeling somewhat better.  Atrial fibrillation with V pacing on telemetry.      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    Ischemic cardiomyopathy    VHD (valvular heart disease)    Acute on chronic heart failure, unspecified heart failure type (HCC)    Pleural effusion on left    Acute cystitis without hematuria    Chronic kidney disease    NICM (nonischemic cardiomyopathy) ([facility])    Chest wall pain    Asymptomatic microscopic hematuria       Current Medications:    Current Facility-Administered Medications:     HYDROmorphone (DILAUDID) injection 0.25 mg, 0.25 mg, IntraVENous, Once, Juan Land MD    lidocaine 4 % external patch 1 patch, 1 patch, TransDERmal, Daily, Juan Land MD, 1 patch at 12/18/24 0842    metoprolol succinate (TOPROL XL) extended release tablet 12.5 mg, 12.5 mg, Oral, BID, Dannielle Nagel APRN - CNP, 12.5 mg at 12/18/24 0840    amiodarone (CORDARONE) tablet 200 mg, 200 mg, Oral, Daily, Dannielle Nagel APRN - CNP, 200 mg at 12/18/24 0840    iron sucrose (VENOFER) injection 200 mg, 200 mg, IntraVENous, Daily, Ian Bennett MD, 200 
    INPATIENT CARDIOLOGY FOLLOW-UP    Name: Young Watkins    Age: 89 y.o.    Date of Admission: 12/15/2024  4:08 PM    Date of Service: 12/20/2024    Primary Cardiologist: Known to me from prior admission.  Follows with [facility]    Chief Complaint: Follow-up for decompensated heart failure    Interim History:  No new overnight cardiac complaints. Currently with no complaints of CP, SOB, palpitations, dizziness, or lightheadedness.     3 L negative.  Feeling better.  On room air.  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    Ischemic cardiomyopathy    VHD (valvular heart disease)    Acute on chronic heart failure, unspecified heart failure type (HCC)    Pleural effusion on left    Acute cystitis with hematuria    Chronic kidney disease    NICM (nonischemic cardiomyopathy) ([facility])    Chest wall pain    Asymptomatic microscopic hematuria       Current Medications:    Current Facility-Administered Medications:     empagliflozin (JARDIANCE) tablet 10 mg, 10 mg, Oral, Daily, Tay Ocasio MD, 10 mg at 12/20/24 0811    HYDROmorphone (DILAUDID) injection 0.25 mg, 0.25 mg, IntraVENous, Once, Juan Land MD    lidocaine 4 % external patch 1 patch, 1 patch, TransDERmal, Daily, Juan Land MD, 1 patch at 12/20/24 0812    metoprolol succinate (TOPROL XL) extended release tablet 12.5 mg, 12.5 mg, Oral, BID, Dannielle Nagel APRN - CNP, 12.5 mg at 12/20/24 0809    amiodarone (CORDARONE) tablet 200 mg, 200 mg, Oral, Daily, Dannielle Nagel APRN - CNP, 200 mg at 12/20/24 0810    iron 
    INPATIENT CARDIOLOGY FOLLOW-UP    Name: Young Watkins    Age: 89 y.o.    Date of Admission: 12/15/2024  4:08 PM    Date of Service: 12/21/2024    Primary Cardiologist: Known to me from prior admission.  Follows with [facility]    Chief Complaint: Follow-up for decompensated heart failure    Interim History:  No new overnight cardiac complaints. Currently with no complaints of CP, SOB, palpitations, dizziness, or lightheadedness.     5.3 L negative.  Feeling better.  On room air.  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    Ischemic cardiomyopathy    VHD (valvular heart disease)    Acute on chronic heart failure, unspecified heart failure type (HCC)    Pleural effusion on left    Acute cystitis with hematuria    Chronic kidney disease    NICM (nonischemic cardiomyopathy) (HCC)    Chest wall pain    Asymptomatic microscopic hematuria       Current Medications:    Current Facility-Administered Medications:     midodrine (PROAMATINE) tablet 5 mg, 5 mg, Oral, TID , Tay Ocasio MD, 5 mg at 12/20/24 1748    empagliflozin (JARDIANCE) tablet 10 mg, 10 mg, Oral, Daily, Tay Ocasio MD, 10 mg at 12/20/24 0811    HYDROmorphone (DILAUDID) injection 0.25 mg, 0.25 mg, IntraVENous, Once, Juan Land MD    lidocaine 4 % external patch 1 patch, 1 patch, TransDERmal, Daily, Juan Land MD, 1 patch at 12/20/24 0812    metoprolol succinate (TOPROL XL) extended release tablet 12.5 mg, 12.5 mg, Oral, BID, Dannielle Nagel APRN - CNP, 12.5 mg at 12/20/24 0809    amiodarone 
    INPATIENT CARDIOLOGY FOLLOW-UP    Name: Young Watkins    Age: 89 y.o.    Date of Admission: 12/15/2024  4:08 PM    Date of Service: 12/22/2024    Primary Cardiologist: Known to me from prior admission.  Follows with [facility]    Chief Complaint: Follow-up for decompensated heart failure    Interim History:  No new overnight cardiac complaints. Currently with no complaints of CP, SOB, palpitations, dizziness, or lightheadedness.     5.3 L negative.  Feeling better.  On room air.  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    Ischemic cardiomyopathy    VHD (valvular heart disease)    Acute on chronic heart failure, unspecified heart failure type (HCC)    Pleural effusion on left    Acute cystitis with hematuria    Chronic kidney disease    NICM (nonischemic cardiomyopathy) (HCC)    Chest wall pain    Asymptomatic microscopic hematuria       Current Medications:    Current Facility-Administered Medications:     [START ON 12/23/2024] bumetanide (BUMEX) tablet 1 mg, 1 mg, Oral, Daily, Bhupinder Kendall MD    midodrine (PROAMATINE) tablet 5 mg, 5 mg, Oral, TID WC, Tay Ocasio MD, 5 mg at 12/22/24 0920    empagliflozin (JARDIANCE) tablet 10 mg, 10 mg, Oral, Daily, Tay Ocasio MD, 10 mg at 12/22/24 0920    HYDROmorphone (DILAUDID) injection 0.25 mg, 0.25 mg, IntraVENous, Once, Juan Land MD    lidocaine 4 % external patch 1 patch, 1 patch, TransDERmal, Daily, Juan Land MD, 1 patch at 12/22/24 0918    metoprolol succinate (TOPROL XL) extended release tablet 12.5 mg, 
    Progress  Note  Chief Complaint   Patient presents with    Shortness of Breath     Historical Issues:  Current Facility-Administered Medications   Medication Dose Route Frequency Provider Last Rate Last Admin    empagliflozin (JARDIANCE) tablet 10 mg  10 mg Oral Daily Tay Ocasio MD   10 mg at 12/19/24 0915    HYDROmorphone (DILAUDID) injection 0.25 mg  0.25 mg IntraVENous Once Juan Land MD        lidocaine 4 % external patch 1 patch  1 patch TransDERmal Daily Juan Land MD   1 patch at 12/19/24 0915    metoprolol succinate (TOPROL XL) extended release tablet 12.5 mg  12.5 mg Oral BID Dannielle Nagel APRN - CNP   12.5 mg at 12/19/24 0911    amiodarone (CORDARONE) tablet 200 mg  200 mg Oral Daily Dannielle Nagel APRN - CNP   200 mg at 12/19/24 0911    iron sucrose (VENOFER) injection 200 mg  200 mg IntraVENous Daily Ian Bennett MD   200 mg at 12/19/24 0911    midodrine (PROAMATINE) tablet 2.5 mg  2.5 mg Oral TID  Annamarie Stanley APRN - CNP   2.5 mg at 12/19/24 0911    sodium chloride flush 0.9 % injection 5-40 mL  5-40 mL IntraVENous 2 times per day Gabriela Quispe APRN - CNP   10 mL at 12/19/24 0912    sodium chloride flush 0.9 % injection 5-40 mL  5-40 mL IntraVENous PRN Gabriela Quispe APRN - CNP        0.9 % sodium chloride infusion   IntraVENous PRN Gabriela Quispe APRN - HAYDEE        polyethylene glycol (GLYCOLAX) packet 17 g  17 g Oral Daily PRN Gabriela Quispe APRN - HAYDEE        acetaminophen (TYLENOL) tablet 650 mg  650 mg Oral Q6H PRN Gabriela Quispe APRN - CNP   650 mg at 12/18/24 2002    Or    acetaminophen (TYLENOL) suppository 650 mg  650 mg Rectal Q6H PRN Gabriela Quispe APRN - CNP        sulfur hexafluoride microspheres (LUMASON) 60.7-25 MG injection 2 mL  2 mL IntraVENous ONCE PRN Add[place]ott, Gabriela J, APRN - CNP        furosemide (LASIX) injection 40 mg  40 mg IntraVENous BID Gabriela Quispe, APRN - CNP   40 mg at 12/19/24 0911    [Held by 
    Progress  Note  Chief Complaint   Patient presents with    Shortness of Breath     Historical Issues:  Current Facility-Administered Medications   Medication Dose Route Frequency Provider Last Rate Last Admin    midodrine (PROAMATINE) tablet 5 mg  5 mg Oral TID  Tay Ocasio MD        empagliflozin (JARDIANCE) tablet 10 mg  10 mg Oral Daily Tay Ocasio MD   10 mg at 12/20/24 0811    HYDROmorphone (DILAUDID) injection 0.25 mg  0.25 mg IntraVENous Once Juan Land MD        lidocaine 4 % external patch 1 patch  1 patch TransDERmal Daily Juan Land MD   1 patch at 12/20/24 0812    metoprolol succinate (TOPROL XL) extended release tablet 12.5 mg  12.5 mg Oral BID Dannielle Nagel APRN - CNP   12.5 mg at 12/20/24 0809    amiodarone (CORDARONE) tablet 200 mg  200 mg Oral Daily Dannielle Nagel APRN - CNP   200 mg at 12/20/24 0810    iron sucrose (VENOFER) injection 200 mg  200 mg IntraVENous Daily Ian Bennett MD   200 mg at 12/20/24 0812    sodium chloride flush 0.9 % injection 5-40 mL  5-40 mL IntraVENous 2 times per day Gabriela Quispe APRN - CNP   10 mL at 12/20/24 0812    sodium chloride flush 0.9 % injection 5-40 mL  5-40 mL IntraVENous PRN Gabriela Quispe APRN - CNP        0.9 % sodium chloride infusion   IntraVENous PRN Gabriela Quispe APRN - HAYDEE        polyethylene glycol (GLYCOLAX) packet 17 g  17 g Oral Daily PRN Gabriela Quispe APRN - HAYDEE        acetaminophen (TYLENOL) tablet 650 mg  650 mg Oral Q6H PRN Gabriela Quispe APRN - CNP   650 mg at 12/18/24 2002    Or    acetaminophen (TYLENOL) suppository 650 mg  650 mg Rectal Q6H PRN Gabriela Quispe APRN - CNP        sulfur hexafluoride microspheres (LUMASON) 60.7-25 MG injection 2 mL  2 mL IntraVENous ONCE PRN Gabriela Quispe APRN - CNP        furosemide (LASIX) injection 40 mg  40 mg IntraVENous BID Gabriela Quispe APRN - CNP   40 mg at 12/20/24 0810    [Held by provider] apixaban (ELIQUIS) tablet 2.5 
    Progress  Note  Chief Complaint   Patient presents with    Shortness of Breath     Historical Issues:  Current Facility-Administered Medications   Medication Dose Route Frequency Provider Last Rate Last Admin    midodrine (PROAMATINE) tablet 5 mg  5 mg Oral TID  Tay Ocasio MD   5 mg at 12/21/24 0905    empagliflozin (JARDIANCE) tablet 10 mg  10 mg Oral Daily Tay Ocasio MD   10 mg at 12/21/24 0905    HYDROmorphone (DILAUDID) injection 0.25 mg  0.25 mg IntraVENous Once Juan Land MD        lidocaine 4 % external patch 1 patch  1 patch TransDERmal Daily Juan Land MD   1 patch at 12/21/24 0904    metoprolol succinate (TOPROL XL) extended release tablet 12.5 mg  12.5 mg Oral BID Dannielle Nagel APRN - CNP   12.5 mg at 12/20/24 0809    amiodarone (CORDARONE) tablet 200 mg  200 mg Oral Daily Dannielle Nagel APRN - CNP   200 mg at 12/21/24 1107    iron sucrose (VENOFER) injection 200 mg  200 mg IntraVENous Daily Ian Bennett MD   200 mg at 12/21/24 0907    sodium chloride flush 0.9 % injection 5-40 mL  5-40 mL IntraVENous 2 times per day Gabriela Quispe APRN - CNP   10 mL at 12/21/24 0908    sodium chloride flush 0.9 % injection 5-40 mL  5-40 mL IntraVENous PRN Gabriela Quispe APRN - CNP        0.9 % sodium chloride infusion   IntraVENous PRN Gabriela Quispe APRN - CNP        polyethylene glycol (GLYCOLAX) packet 17 g  17 g Oral Daily PRN Gabriela Quispe APRN - CNP        acetaminophen (TYLENOL) tablet 650 mg  650 mg Oral Q6H PRN Gabriela Quispe APRN - CNP   650 mg at 12/18/24 2002    Or    acetaminophen (TYLENOL) suppository 650 mg  650 mg Rectal Q6H PRN Gabriela Quispe APRN - CNP        sulfur hexafluoride microspheres (LUMASON) 60.7-25 MG injection 2 mL  2 mL IntraVENous ONCE PRN Gabriela Quispe APRN - CNP        furosemide (LASIX) injection 40 mg  40 mg IntraVENous BID Gabriela Quispe, APRN - CNP   40 mg at 12/20/24 1820    [Held by provider] apixaban 
4 Eyes Skin Assessment     NAME:  Young Watkins  YOB: 1930  MEDICAL RECORD NUMBER:  [id number]    The patient is being assessed for  Admission    I agree that at least one RN has performed a thorough Head to Toe Skin Assessment on the patient. ALL assessment sites listed below have been assessed.      Areas assessed by both nurses:    Head, Face, Ears, Shoulders, Back, Chest, Arms, Elbows, Hands, Sacrum. Buttock, Coccyx, Ischium, Legs. Feet and Heels, and Under Medical Devices         Does the Patient have a Wound? Yes wound(s) were present on assessment. LDA wound assessment was Initiated and completed by RN       Eugene Prevention initiated by RN: Yes  Wound Care Orders initiated by RN: Yes    Pressure Injury (Stage 3,4, Unstageable, DTI, NWPT, and Complex wounds) if present, place Wound referral order by RN under : Yes    New Ostomies, if present place, Ostomy referral order under : No     Nurse 1 eSignature: Electronically signed by Fer Dominique RN on 12/16/24 at 7:08 PM EST    **SHARE this note so that the co-signing nurse can place an eSignature**    Nurse 2 eSignature: Electronically signed by Jose Enrique Hendrickson RN on 12/16/24 at 7:33 PM EST  
CBI clamped.  
CMU called and reported that patient had 13 beats of vtach. Patient laying in bed asymptomatic at this time. Notified hopitalist NP.    
Cmr notified  of pts arrival to unit and monitor placement,   
Database initiated. Patient is A&O comes in from [facility]. He uses a walker and is RA at baseline. He is vary hard of hearing.   
Dr Che notified of K 3.4 with no PRN scale in place. Orders received.   
Dr. Che notified that per urology they prefer the eliquis not be restarted, and they also do not think a urine culture is warranted   
Following albumin, BP was 97/69. Annamarie CHAMPAGNE notified. Lasix to be given and metoprolol to be held. Midodrine orders added for a one time dose tonight and TID with meals to start in the morning.   
Gabriela Quispe NP notified of patient having 26 beats of vtach and is asymptomatic. Orders received.   
Initial Inpatient Wound Care    Admit Date: 12/15/2024  4:08 PM    Reason for consult:  rt heel    Significant history:  adm SOB  Acute cystitis with hematuria, DM2, chronic way, radiation cystitis  From facility  Wound history:  POA    Findings:  awake and verbal.     Impression:  rt heel purple area-2x2-DTI          Plan:continue heel relief  Sure prep to heel      Vilma Jones RN 12/19/2024 11:52 AM      
Message sent to Dr Ocasio per Dr Araujo regarding pts pacer battery needing replaced per pts daughter.   
Message sent to Dr. Che with Rhoda pre cert being approved through 12/26.  
Messaged Dr Land regarding Lokelma and Eliquis orders.   
Metoprolol held at hs for ordered parameters    Discussed 2300 hrs BP with nocturnal NP, pt. Denied related symptoms, NNO at this time.  
[employer] Pacemaker interrogated.   
Nephrology Attending   Inpatient Progress Note    Admit Date: 12/15/2024                   PCP: Shon Lynch MD    History of presenting illness:   As per our consult this admission:  The patient is a 89 y.o. male patient with PMH of CHF, A-fib, radiation cystitis with chronic Bingham, chronic hematuria, CKD       They presented on 12/15/2024 complaining of SOB.  He also complains of back pain that radiates.  He was also reported to have a fever rehabilitation along with a dry cough. In addition labs showed a potassium of 5.3 and a creatinine of 2.  In the emergency department he was found to have a pleural effusion.  Nephrology was consulted for hyperkalemia.     Subjective:   2024: Patient seen at bedside today.  No issues overnight.  No new complaints.       Vitals:  VITALS: /82   Pulse 78   Temp 98 °F (36.7 °C) (Oral)   Resp 16   Ht 1.753 m (5' 9\")   Wt 93.6 kg (206 lb 5.6 oz)   SpO2 99%   BMI 30.47 kg/m²   24HR INTAKE/OUTPUT:   Intake/Output Summary (Last 24 hours) at 2024 1344  Last data filed at 2024 1340  Gross per 24 hour   Intake 165 ml   Output 775 ml   Net -610 ml     CURRENT PULSE OXIMETRY: SpO2: 99 %  24HR PULSE OXIMETRY RANGE: SpO2  Av %  Min: 96 %  Max: 99 %       I/O:   I/O last 3 completed shifts:  In: 165.6 [P.O.:60; I.V.:10; IV Piggyback:95.6]  Out: 5700 [Urine:5700]  I/O this shift:  In: 165 [P.O.:165]  Out: -4375     Medications:    IV:   sodium chloride        empagliflozin  10 mg Oral Daily    HYDROmorphone  0.25 mg IntraVENous Once    lidocaine  1 patch TransDERmal Daily    metoprolol succinate  12.5 mg Oral BID    amiodarone  200 mg Oral Daily    iron sucrose  200 mg IntraVENous Daily    midodrine  2.5 mg Oral TID WC    sodium chloride flush  5-40 mL IntraVENous 2 times per day    furosemide  40 mg IntraVENous BID    [Held by provider] apixaban  2.5 mg Oral BID    cyanocobalamin  500 mcg Oral Daily    docusate sodium  200 mg Oral Nightly    famotidine  
New consults placed for Cardiology- CHF exacerbation, Nephrology- CKD management, and wound care for right heel blister.  
Nocturnist note reviewed, agree with plan and additions as below:    Agree with A/P from admitting physician.  Nephrology consulted  Monitor vitals  Monitor kidney function.  Replace electrolytes as needed.  Give few doses of Lokelma.  Calcium gluconate      NOTE: Portions of this report may have been transcribed using voice recognition software. Every effort was made to ensure accuracy; however, inadvertent computerized transcription errors may be present.  Electronically signed by Juan Land MD on 12/16/2024 at 7:48 AM    
Nurse to nurse called to Shawna patel [facility].   
Occupational Therapy  OCCUPATIONAL THERAPY INITIAL EVALUATION  [facility]  [address] [place], [place]    Date: 2024     Patient Name: Young Watkins  MRN: [id number]  : 1930  Room: [address] [place] [address]    Evaluating OT: Nicole Nick, OTR/L - [id number]    Referring Provider: Juan Land MD  Specific Provider Orders/Date: \"OT eval and treat\" - 2024    Diagnosis: Hyperkalemia [E87.5]  Pleural effusion [J90]  Pleural effusion on left [J90]  Acute on chronic systolic congestive heart failure (HCC) [I50.23]  Acute cystitis without hematuria [N30.00]  Chronic kidney disease, unspecified CKD stage [N18.9]  Acute on chronic congestive heart failure, unspecified heart failure type (HCC) [I50.9]     Patient underwent L thoracentesis on 2024.    Pertinent Medical History: a-fib, CHF, CKD, DM     Precautions: fall risk, [place], skin integrity, way catheter, bed/chair alarms    Assessment of Current Deficits:    [x] Functional mobility   [x] ADLs  [x] Strength               [x] Cognition   [x] Functional transfers   [x] IADLs         [x] Safety Awareness   [x] Endurance   [] Fine Motor Coordination  [x] Balance      [] Vision/Perception   [x] Sensation    [] Gross Motor Coordination [] ROM          [] Delirium                  [] Motor Control     OT PLAN OF CARE   OT POC is based on physician orders, patient diagnosis, and results of clinical assessment.  Frequency/Duration 2-5 days/week for 2-4 weeks PRN   Specific OT Treatment Interventions to Include:   * Instruction/training on adapted ADL techniques and AE recommendations to increase functional independence within precautions       * Training on energy conservation strategies, correct breathing pattern and techniques to improve independence/tolerance for self-care routine  * Functional transfer/mobility training/DME recommendations for increased independence, safety, and fall 
Occupational Therapy  OT BEDSIDE TREATMENT NOTE      Date:2024  Patient Name: Young Watkins  MRN: [id number]  : 1930  Room: [address] [place], [place] [address]     Evaluating OT: Nicole Nick, OTR/BALDO - [id number]     Referring Provider: Juan Land MD  Specific Provider Orders/Date: \"OT eval and treat\" - 2024     Diagnosis: Hyperkalemia [E87.5]  Pleural effusion [J90]  Pleural effusion on left [J90]  Acute on chronic systolic congestive heart failure (HCC) [I50.23]  Acute cystitis without hematuria [N30.00]  Chronic kidney disease, unspecified CKD stage [N18.9]  Acute on chronic congestive heart failure, unspecified heart failure type (HCC) [I50.9]      Patient underwent L thoracentesis on 2024.     Pertinent Medical History: a-fib, CHF, CKD, DM      Precautions: fall risk     Assessment of Current Deficits:    [x] Functional mobility             [x] ADLs          [x] Strength                  [x] Cognition   [x] Functional transfers           [x] IADLs         [x] Safety Awareness   [x] Endurance   [] Fine Motor Coordination    [x] Balance      [] Vision/Perception   [x] Sensation    [] Gross Motor Coordination [] ROM          [] Delirium                  [] Motor Control      OT PLAN OF CARE   OT POC is based on physician orders, patient diagnosis, and results of clinical assessment.  Frequency/Duration 2-5 days/week for 2-4 weeks PRN   Specific OT Treatment Interventions to Include:   * Instruction/training on adapted ADL techniques and AE recommendations to increase functional independence within precautions       * Training on energy conservation strategies, correct breathing pattern and techniques to improve independence/tolerance for self-care routine  * Functional transfer/mobility training/DME recommendations for increased independence, safety, and fall prevention  * Patient/Family education to increase follow through with safety techniques and functional independence  * Recommendation of 
Palliative consult placed - goals of care   
Patient BP 88/49. Notified Annamarie NP and albumin order given.   
Patient urinated 100mL with external catheter on. Bladder scanned was obtained for a total of 364mL PVR.   
Patient's daughter Radha updated on the plan of care.   
Physical Therapy  Facility/Department: [address] INTERMEDIATE 1  Physical Therapy Treatment Note    Name: Young Watkins  : 1930  MRN: [id number]  Date of Service: 2024           Patient Diagnosis(es): The primary encounter diagnosis was Pleural effusion on left. Diagnoses of Hyperkalemia, Acute cystitis without hematuria, Acute on chronic congestive heart failure, unspecified heart failure type (HCC), Chronic kidney disease, unspecified CKD stage, and Acute on chronic systolic congestive heart failure (HCC) were also pertinent to this visit.  Past Medical History:  has a past medical history of A-fib (HCC), CHF (congestive heart failure) (HCC), CKD (chronic kidney disease), DM (diabetes mellitus) (HCC), and Radiation cystitis.  Past Surgical History:  has no past surgical history on file.        Evaluating Therapist: Amy Sarah PT      Room #:  0433/0433-A  Diagnosis:  Hyperkalemia [E87.5]  Pleural effusion [J90]  Pleural effusion on left [J90]  Acute on chronic systolic congestive heart failure (HCC) [I50.23]  Acute cystitis without hematuria [N30.00]  Chronic kidney disease, unspecified CKD stage [N18.9]  Acute on chronic congestive heart failure, unspecified heart failure type (HCC) [I50.9]  PMHx/PSHx:  Afib, CHF, CKD, DM  Procedure: L thoracentesis   Precautions:  falls, alarm, hard of hearing      Social:  Pt admitted from [facility]. Prior to [facility] was at [facility]. Ambulates with ww     Initial Evaluation  Date: 24 Treatment   2024   Short Term/ Long Term   Goals   Was pt agreeable to Eval/treatment? yes yes    Does pt have pain? L side pain L knee pain/instability    Bed Mobility  Rolling: min assist  Supine to sit: min assist  Sit to supine: NT  Scooting: min assist Rolling: Mod A  Supine to sit: Mod A  Sit to supine; NT  Scooting: Mod  A seated to EOB independent   Transfers Sit to stand: min assist  Stand to sit: min assist  Stand pivot: min assist Sit <> stand: Mod A independent   Ambulation  
Physical Therapy  Facility/Department: [address] INTERMEDIATE 1  Physical Therapy Initial Assessment    Name: Young Watkins  : 1930  MRN: [id number]  Date of Service: 2024           Patient Diagnosis(es): The primary encounter diagnosis was Pleural effusion on left. Diagnoses of Hyperkalemia, Acute cystitis without hematuria, Acute on chronic congestive heart failure, unspecified heart failure type (HCC), Chronic kidney disease, unspecified CKD stage, and Acute on chronic systolic congestive heart failure (HCC) were also pertinent to this visit.  Past Medical History:  has a past medical history of A-fib (HCC), CHF (congestive heart failure) (HCC), CKD (chronic kidney disease), DM (diabetes mellitus) (HCC), and Radiation cystitis.  Past Surgical History:  has no past surgical history on file.        Evaluating Therapist: Amy Sarah PT      Room #:  0433/0433-A  Diagnosis:  Hyperkalemia [E87.5]  Pleural effusion [J90]  Pleural effusion on left [J90]  Acute on chronic systolic congestive heart failure (HCC) [I50.23]  Acute cystitis without hematuria [N30.00]  Chronic kidney disease, unspecified CKD stage [N18.9]  Acute on chronic congestive heart failure, unspecified heart failure type (HCC) [I50.9]  PMHx/PSHx:  Afib, CHF, CKD, DM  Procedure: L thoracentesis   Precautions:  falls, alarm, hard of hearing      Social:  Pt admitted from [facility]. Prior to [facility] was at [facility]. Ambulates with ww     Initial Evaluation  Date: 24 Treatment      Short Term/ Long Term   Goals   Was pt agreeable to Eval/treatment? yes     Does pt have pain? L side pain     Bed Mobility  Rolling: min assist  Supine to sit: min assist  Sit to supine: NT  Scooting: min assist  independent   Transfers Sit to stand: min assist  Stand to sit: min assist  Stand pivot: min assist  independent   Ambulation    75 feet with ww with CGA  150 feet with ww with supervision   Stair Negotiation  Ascended and descended  NT   N/A   LE strength     
Physical Therapy  Facility/Department: [address] INTERMEDIATE 1  Physical Therapy Treatment Note    Name: Young Watkins  : 1930  MRN: [id number]  Date of Service: 2024           Patient Diagnosis(es): The primary encounter diagnosis was Pleural effusion on left. Diagnoses of Hyperkalemia, Acute cystitis without hematuria, Acute on chronic congestive heart failure, unspecified heart failure type (HCC), Chronic kidney disease, unspecified CKD stage, and Acute on chronic systolic congestive heart failure (HCC) were also pertinent to this visit.  Past Medical History:  has a past medical history of A-fib (HCC), CHF (congestive heart failure) (HCC), CKD (chronic kidney disease), DM (diabetes mellitus) (HCC), and Radiation cystitis.  Past Surgical History:  has no past surgical history on file.        Evaluating Therapist: Amy Sarah PT      Room #:  0433/0433-A  Diagnosis:  Hyperkalemia [E87.5]  Pleural effusion [J90]  Pleural effusion on left [J90]  Acute on chronic systolic congestive heart failure (HCC) [I50.23]  Acute cystitis without hematuria [N30.00]  Chronic kidney disease, unspecified CKD stage [N18.9]  Acute on chronic congestive heart failure, unspecified heart failure type (HCC) [I50.9]  PMHx/PSHx:  Afib, CHF, CKD, DM  Procedure: L thoracentesis   Precautions:  falls, alarm, hard of hearing      Social:  Pt admitted from [facility]. Prior to [facility] was at [facility]. Ambulates with ww     Initial Evaluation  Date: 24 Treatment   2024   Short Term/ Long Term   Goals   Was pt agreeable to Eval/treatment? yes yes    Does pt have pain? L side pain R ankle pain    Bed Mobility  Rolling: min assist  Supine to sit: min assist  Sit to supine: NT  Scooting: min assist Rolling: Min A  Supine to sit: Min A  Sit to supine; Mod A  Scooting: Min A seated to EOB independent   Transfers Sit to stand: min assist  Stand to sit: min assist  Stand pivot: min assist Sit <> stand: Min A independent   Ambulation    75 
Procedure: Ultrasound Guided left Thoracentesis    Diagnosis: Pleural Effusion    Findings: Pleural effusion, successful catheter placement with hazy katy pleural fluid return    Specimen:  Approximately 60cc obtained and sent for analysis (orders from referring physician). Total amount of fluid removed 850ml     Anesthesia: Local infiltration of 5 cc 1% Lidocaine without epi    EBL: Minimal.    Complication: None immediately post procedure.    Plan: Post thoracentesis chest xray.     Comments:    See radiology dictation in PACs for image review and additional procedural information.  
Pt's daughter reports that pt has been calling family members at later hours of the night and pt also reported that he telephoned late daughter this morning who pasted away 15 years ago. Pt alert and oriented x 4 at this time. VSS. Dr. Hassan alerted.      
Spiritual Health History and Assessment/Progress Note  [facility]    Attempted Encounter,  ,  ,      Name: Young Watkins MRN: [id number]    Age: 89 y.o.     Sex: male   Language: English   Shinto: Sikh   Pleural effusion on left     Date: 12/18/2024                           Spiritual Assessment began in [facility] INTERMEDIATE 1        Referral/Consult From: Other    Encounter Overview/Reason: Attempted Encounter  Service Provided For: Patient    Moraima, Belief, Meaning:   Patient unable to assess at this time  Family/Friends No family/friends present      Importance and Influence:  Patient unable to assess at this time  Family/Friends No family/friends present    Community:  Patient Other: Unable to assess  Family/Friends No family/friends present    Assessment and Plan of Care:     Patient Interventions include: Other: Unable to assess  Family/Friends Interventions include: No family/friends present    Patient Plan of Care: Spiritual Care available upon further referral  Family/Friends Plan of Care: No family/friends present    Electronically signed by Chaplain Hector on 12/18/2024 at 7:56 PM    
Spoke w daughter Radha regarding an update on pt   
Spoke w pts daughter Radha with an update on pt status and new orders   
[facility]  Nephrology Progress Note  Patient's Name: Young Watkins  1:05 PM  12/21/2024    Summary: This is a 89-year-old male with a past medical history of atrial fibrillation, CHF, radiation cystitis with chronic Bingham catheter placement, chronic microscopic/gross hematuria, whom we are following for chronic kidney disease.  His baseline creatinine is around 1.5-1.9 mg/dL.        Interval History:    12/20:  Seen and examined  Excellent urine output  Feels well  Urine output is voluminous and appears much less grossly bloody  Patient in no distress and has no complaints    12/21: Patient seen and examined at bedside.  No feel members are present.  Making good urine output nearly 2 L.  On room air, no chest pain or shortness of breath.    Past Medical History:   Diagnosis Date    A-fib (HCC)     CHF (congestive heart failure) (HCC)     CKD (chronic kidney disease)     DM (diabetes mellitus) (HCC)     Radiation cystitis        History reviewed. No pertinent surgical history.    No family history on file.     reports that he has never smoked. He has never used smokeless tobacco. He reports that he does not drink alcohol and does not use drugs.    Allergies:  Axid [nizatidine], Indocin [indomethacin], Klor-con [potassium chloride], Levaquin [levofloxacin], and Ranexa [ranolazine]        Current Facility-Administered Medications   Medication Dose Route Frequency Provider Last Rate Last Admin    midodrine (PROAMATINE) tablet 5 mg  5 mg Oral TID  Tay Ocasio MD   5 mg at 12/21/24 0905    empagliflozin (JARDIANCE) tablet 10 mg  10 mg Oral Daily Tay Ocasio MD   10 mg at 12/21/24 0905    HYDROmorphone (DILAUDID) injection 0.25 mg  0.25 mg IntraVENous Once Juan Land MD        lidocaine 4 % external patch 1 patch  1 patch TransDERmal Daily Juan Land MD   1 patch at 12/21/24 0904    metoprolol succinate (TOPROL XL) extended release tablet 12.5 mg  12.5 mg Oral BID Dannielle Nagel APRN - CNP 
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  His baseline creatinine is around 1.5-1.9 mg/dL.        Interval History:    12/20:  Seen and examined  Excellent urine output  Feels well  Urine output is voluminous and appears much less grossly bloody  Patient in no distress and has no complaints    12/21: Patient seen and examined at bedside.  No feel members are present.  Making good urine output nearly 2 L.  On room air, no chest pain or shortness of breath.    12/22: Patient seen and examined at bedside.  Resting comfortably in bed.  Making good urine output 1.7 L over past 24 hours.  No chest pain or shortness of breath.  Legs are slightly tender to touch.  Has some light pink-tinged urine in his Bingham.  Urology note reviewed, CBI presently clamped.    12/23: Patient seen and examined at bedside.  Resting comfortably.  No hematuria in his Bingham bag.  No acute events overnight.  Blood pressures are stable.  No family present at bedside.    Past Medical History:   Diagnosis Date    A-fib (HCC)     CHF (congestive heart failure) (HCC)     CKD (chronic kidney disease)     DM (diabetes mellitus) (HCC)     Radiation cystitis        History reviewed. No pertinent surgical history.    No family history on file.     reports that he has never smoked. He has never used smokeless tobacco. He reports that he does not drink alcohol and does not use drugs.    Allergies:  Axid [nizatidine], Indocin [indomethacin], Klor-con [potassium chloride], Levaquin [levofloxacin], and Ranexa [ranolazine]        Current Facility-Administered Medications   Medication Dose Route Frequency Provider Last Rate Last Admin    bumetanide (BUMEX) tablet 1 mg  1 mg 
This RN called and spoke with OSBALDO Garcia to provide update.  Daughter thankful for update.   
  Extremities: no cyanosis, clubbing or edema         Labs:     Recent Labs     12/19/24  0135      K 3.5   CL 98   CO2 26   BUN 50*   CREATININE 2.0*   GLUCOSE 107*   CALCIUM 8.2*       Lab Results   Component Value Date/Time    HGB 9.7 12/19/2024 01:35 AM    HCT 31.1 12/19/2024 01:35 AM       No results found for: \"PSA\"      Assessment/Plan:  Gross hematuria  History of prostate cancer    Creatinine 2.0  Continue to monitor the creatinine   Nephrology is following   Continue to hold anticoagulation   Continue the way   Continue the CBI   Continue to irrigate the way q4hrs and prn for clot removal   He may need Alum   He will need hyperbaric   We will follow       RUSS Del Valle - CNP   [facility]  Urology      
13.5  Weight Adjustment For: No Adjustment     BMI Categories: Obese Class 1 (BMI 30.0-34.9)    Nutrient - Energy (Caloric) Requirements    Energy Requirements Based On: Formula  Weight Used for Energy Requirements: Current  Weight for Energy Calculation (kg): 93 kg  Total Energy Requirements (kcals/day): 9079-5838    Nutrient - Protein Requirements    Weight Used for Protein Requirements: Ideal  Weight in Kg Used for Protein Requirements: 73 kg  Estimated Total Protein (g/day): 85-95    Fluid Requirements    Method Used for Fluid Requirements: 1 ml/kcal  Estimated Daily Total Fluid (ml/day): 9870-4903 ml      Nutrition Diagnosis:   Increased nutrient needs related to increase demand for energy/nutrients as evidenced by wounds    Nutrition Interventions:   Food and/or Nutrient Delivery: Continue Current Diet, Start Oral Nutrition Supplement  Nutrition Education/Counseling: No recommendation at this time  Coordination of Nutrition Care: Continue to monitor while inpatient     Goals:  Goals: PO intake 75% or greater, by next RD assessment  Type of Goal: New goal  Previous Goal Met: New Goal    Nutrition Monitoring and Evaluation:   Behavioral-Environmental Outcomes: None Identified  Food/Nutrient Intake Outcomes: Food and Nutrient Intake, Supplement Intake  Physical Signs/Symptoms Outcomes: Biochemical Data, GI Status, Fluid Status or Edema, Nutrition Focused Physical Findings, Skin, Weight    Discharge Planning:    Continue current diet     KIRA BARNARD MPH, RD, LD  Contact: x [phone]    
chloride flush, sodium chloride, polyethylene glycol, acetaminophen **OR** acetaminophen, sulfur hexafluoride microspheres    Scheduled:    bumetanide  1 mg Oral Daily    midodrine  5 mg Oral TID WC    empagliflozin  10 mg Oral Daily    HYDROmorphone  0.25 mg IntraVENous Once    lidocaine  1 patch TransDERmal Daily    metoprolol succinate  12.5 mg Oral BID    amiodarone  200 mg Oral Daily    sodium chloride flush  5-40 mL IntraVENous 2 times per day    [Held by provider] apixaban  2.5 mg Oral BID    cyanocobalamin  500 mcg Oral Daily    docusate sodium  200 mg Oral Nightly    famotidine  20 mg Oral Daily    ferrous sulfate  325 mg Oral Daily with breakfast    folic acid  1 mg Oral Daily    mirtazapine  15 mg Oral Nightly    pravastatin  40 mg Oral Nightly    Vitamin D  1,000 Units Oral Daily       Lab Results   Component Value Date/Time     12/24/2024 06:08 AM    K 3.4 12/24/2024 06:08 AM    BUN 72 12/24/2024 06:08 AM    CREATININE 1.9 12/24/2024 06:08 AM        Lab Results   Component Value Date/Time    HGB 9.9 12/24/2024 06:08 AM    HCT 30.9 12/24/2024 06:08 AM       No results found for: \"PSA\"    Review Of Systems:  Constitutional: Tired  Eyes: negative  Ears, nose, mouth, throat, and face: negative  Respiratory: Pleural effusion  Cardiovascular: Atrial fibrillation  Gastrointestinal: Diverticulosis  Genitourinary: Prostate cancer  Musculoskeletal: negative  Neurological: negative  Behavioral/Psych: negative  Endocrine: Diabetes    Physical Exam:  Skin is dry, and without rashes  Respirations are non-labored, intact  Abdomen is soft, non-tender, non-distended.  Active bowel sounds are present.  No rebound or guarding  Alert and oriented x 3.  No focal motor/sensory deficits    Assessment and Plan:  Gross hematuria/History of prostate cancer (S/P Prostate XRT)/Radiation induced hemorrhagic cystitis  -Urine culture on 12/15/2024 was negative  -CT scan on 12/6/2024 shows a large left pleural effusion, no 
environmental modifications for increased safety with functional transfers/mobility and ADLs  * Cognitive retraining/development of therapeutic activities to improve problem solving, judgement, memory, and attention for increased safety/participation in ADL/IADL tasks  * Therapeutic exercise to improve motor endurance, ROM, and functional strength for ADLs/functional transfers  * Therapeutic activities to facilitate/challenge dynamic balance, stand tolerance for increased safety and independence with ADLs  * Neuro-muscular re-education: facilitation of righting/equilibrium reactions, midline orientation, scapular stability/mobility, normalization of muscle tone, and facilitation of volitional active controled movement  * Positioning to improve skin integrity, interaction with environment and functional independence     Recommended Adaptive Equipment: TBD      Home Living: Patient admitted from SNF[facility]; patient had been a resident of an penitentiary prior, per review of patient's medical record.     Prior Level of Function (PLOF): Patient had been needing assist with ADLs and had been working with therapy at the SNF recently. Patient was using a walker for functional mobility.     Pain Level: Pt did not report pain.   Cognition: Pt awake and alert.      Functional Assessment:                  AM-PAC Daily Activity Raw Score: 14/24    Initial Eval Status  Date: 12/17/2024 Treatment Status  Date: 12/23/24  Short Term Goals = Long Term Goals   Feeding SBA  Setup   Grooming Mod A Min A   SBA (seated/standing)   UB Dressing Min A Min A to don gown as a robe.     Setup   LB Dressing Max A Max A  Min A - with use of AE, as needed/appropriate   Bathing Max A   Min A - with use of AE/DME, as needed/appropriate   Toileting Max A  Patient with way catheter. way  Min A   Bed Mobility  Supine-to-Sit: Min A  Mod A supine to sit   Independent in order to maximize patient's independence/participation with ADLs, re-positioning, and other 
mcg  500 mcg Oral Daily Gabriela Quispe APRN - CNP   500 mcg at 12/22/24 0920    docusate sodium (COLACE) capsule 200 mg  200 mg Oral Nightly Gabriela Quispe APRN - CNP   200 mg at 12/21/24 2118    famotidine (PEPCID) tablet 20 mg  20 mg Oral Daily Gabriela Quispe APRN - CNP   20 mg at 12/21/24 2118    ferrous sulfate (IRON 325) tablet 325 mg  325 mg Oral Daily with breakfast Gabriela Quispe APRN - CNP   325 mg at 12/22/24 0920    folic acid (FOLVITE) tablet 1 mg  1 mg Oral Daily Gabriela Quispe APRN - CNP   1 mg at 12/22/24 0920    mirtazapine (REMERON) tablet 15 mg  15 mg Oral Nightly Gabriela Quispe APRN - CNP   15 mg at 12/21/24 2117    pravastatin (PRAVACHOL) tablet 40 mg  40 mg Oral Nightly Gabriela Quispe APRN - CNP   40 mg at 12/21/24 2117    Vitamin D (CHOLECALCIFEROL) tablet 1,000 Units  1,000 Units Oral Daily Gabriela Quispe APRN - CNP   1,000 Units at 12/21/24 2117       Recent Complaints:  Review of Systems  Vitals:    12/22/24 0919   BP: 107/62   Pulse: 86   Resp:    Temp:    SpO2:      Physical Exam  Vitals reviewed.   Cardiovascular:      Rate and Rhythm: Normal rate.   Pulmonary:      Effort: Pulmonary effort is normal.      Breath sounds: Normal breath sounds.   Genitourinary:     Comments: Still w punch colored urine  Musculoskeletal:      Right lower leg: Edema present.      Left lower leg: Edema present.         Recent Labs     12/20/24  0155 12/21/24  1130 12/22/24  0410    135 134   K 4.2 3.9 3.7   CL 99 95* 96*   CO2 27 30* 29   BUN 60* 62* 61*   CREATININE 1.8* 2.0* 1.9*   GLUCOSE 107* 169* 131*   CALCIUM 8.3* 8.7 8.7     Recent Labs     12/22/24  0410   WBC 8.5   RBC 3.19*   HGB 9.8*   HCT 30.8*   MCV 96.6   MCH 30.7   MCHC 31.8*   RDW 15.4*      MPV 10.8           Principal Problem:    Pleural effusion on left  Active Problems:    Acute heart failure, unspecified heart failure type (HCC)    Acute on chronic congestive heart failure (HCC)    VHD 
Value Date/Time    PROTIME 14.6 12/16/2024 12:49 PM    INR 1.4 12/16/2024 12:49 PM     Recent Labs     12/15/24  1722   PROBNP 8,362*      Latest Reference Range & Units 12/16/24 14:00   Appearance, Fluid  Cloudy   Color, Fluid  Yellow   RBC, Fluid cells/uL 6,000   Glucose, Fluid mg/dL 146   LD, Fluid U/L 91   Monocyte Count, Fluid % 25   Neutrophil Count, Fluid % 75   pH, Fluid  7.476   Total Protein, Body Fluid g/dL 2.6   WBC, Fluid cells/uL 3,577   Cholesterol, Fluid mg/dL 24   Clot Check  None Seen        Assessment:    Moderate to large left pleural effusion with atelectasis, s/p thoracentesis 12/16/24 with 850 ml removed, transudate  Small right pleural effusion  Decompensated heart failure, reduced EF 35-40%, valvular heart disease  A-fib on Eliquis  History of CAD  GERD  History of MI  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 Follow culture and cytology  Post procedure CXR negative for PTX  Rocephin for bacteriuria per primary team  Repeat echo pending.  Elevated troponin and proBNP  Diuresis limited by renal function  DVT, GI prophylaxis      This plan of care was reviewed in collaboration with Dr. Hoffman    Electronically signed by RUSS Ramsay CNP on 12/17/2024 at 1:42 PM        This is confirmation that I have personally performed a substantial portion of medical decision making (>50%) related to this patient encounter.  The medications & laboratory data and imagery were discussed and adjusted where necessary. Key issues of the case were discussed among consultants.  Review of CNP documentation was conducted and revisions were made as appropriate. I agree with the above documented exam, problem list and plan of care with the following 
area by continuity VTI is 1.0 cm2. AV Stroke Volume index is 17.7 mL/m2.    Mitral Valve: Moderate to severe regurgitation.    Tricuspid Valve: Moderate to severe regurgitation.    Left Atrium: Left atrium is severely dilated.    Right Atrium: Right atrium is moderately dilated.    Aorta: Normal sized aortic root. Mildly dilated ascending aorta. Ao ascending diameter is 3.7 cm.    Image quality is adequate.       Labs:  Lab Results   Component Value Date/Time    WBC 5.7 12/18/2024 08:32 AM    RBC 3.09 12/18/2024 08:32 AM    HGB 9.5 12/18/2024 08:32 AM    HCT 30.7 12/18/2024 08:32 AM    MCV 99.4 12/18/2024 08:32 AM    MCH 30.7 12/18/2024 08:32 AM    MCHC 30.9 12/18/2024 08:32 AM    RDW 15.2 12/18/2024 08:32 AM     12/18/2024 08:32 AM    MPV 10.5 12/18/2024 08:32 AM     Lab Results   Component Value Date/Time     12/18/2024 08:32 AM    K 3.6 12/18/2024 08:32 AM     12/18/2024 08:32 AM    CO2 27 12/18/2024 08:32 AM    BUN 40 12/18/2024 08:32 AM    CREATININE 2.2 12/18/2024 08:32 AM    CALCIUM 8.4 12/18/2024 08:32 AM    LABGLOM 28 12/18/2024 08:32 AM    LABGLOM 38 04/08/2024 05:45 AM     Lab Results   Component Value Date/Time    PROTIME 14.6 12/16/2024 12:49 PM    INR 1.4 12/16/2024 12:49 PM     Recent Labs     12/15/24  1722   PROBNP 8,362*      Latest Reference Range & Units 12/16/24 14:00   Appearance, Fluid  Cloudy   Color, Fluid  Yellow   RBC, Fluid cells/uL 6,000   Glucose, Fluid mg/dL 146   LD, Fluid U/L 91   Monocyte Count, Fluid % 25   Neutrophil Count, Fluid % 75   pH, Fluid  7.476   Total Protein, Body Fluid g/dL 2.6   WBC, Fluid cells/uL 3,577   Cholesterol, Fluid mg/dL 24   Clot Check  None Seen     Pleural fluid cultures 12/16/2024  Specimen Description .PLEURAL FLUID [facility]   Special Requests Site: Body Fluid [facility]   Direct Exam Gram stain performed on unspun fluid. [facility]    FEW Polymorphonuclear leukocytes 
  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 Last PTH 60.9   Plan  No changes    Continue to monitor levels    Acid base  Bicarbonate 27 mmol/L today.   Chloride 100 mmol/dL today.   AG 9 mmol/L today with an albumin on 3.4 g/dL.  Plan  No changes     Anemia in CKD   Hg today 9.5 g/dL  Ferritin 335, Iron 34 and Iron saturation of 14%  Plan    Transfuse as per primary team   No changes today.    Care reviewed with the patient's family as available.    Ian Bennett MD  12/18/2024    
TIBC 246 12/16/2024 12:37 PM     FERRITIN:    Lab Results   Component Value Date/Time    FERRITIN 335 12/16/2024 12:37 PM        Imaging:  Pertinent imaging reviewed    Assessment/Plan:    1-CKD Stage 3b/4 borderline  -Baseline creatinine around 1.5 to 2.0 mg/dL  -Remains nonoliguric, creatinine stabilized 1.9 mg/dL today  -Continue Bingham, continue supportive care  -He is on Lasix 40 mg IV twice daily, patient transitioning to oral Bumex 1 mg daily from tomorrow, notably patient is also on Jardiance 10 mg daily  -Stable from renal perspective, monitor labs    2-electrolyte/acid base disorders  -Electrolyte parameters satisfactory, hyperkalemia, possibly in setting of mild micro obstruction, resolved; potassium today 3.7 mmol/L  -CO2 29 slightly improved after cutting back diuretics  -Other parameters stable    3-BP/volume status  -Patient is normotensive although on the lower end of normal range, 100s to 110s over 60s to 70s, trends improved    -Continues on Jardiance 10 mg daily, reducing IV Lasix from twice daily down to once daily and will start oral Bumex 1 mg daily tomorrow  -Would hold metolazone and other further diuretics  -He can continue on metoprolol succinate 12.5 mg daily    4-CKD-MBD  -Calcium 8.7 mg/dL with phosphorus 3.6 mg/dL   -stable, monitor    5-Anemia of CKD  -Hemoglobin 9.8 g/dL, stable  -Mild iron deficiency noted, has received oral iron and Venofer x 3 doses of 200 mg, will not dose any further    6-Nutrition  -Albumin last 3.1 g/dL, stable, monitor        Bhupinder Kendall MD  12:34 PM  12/22/2024    
mg/dL  -Creatinine is improved nicely, today 1.8 mg/dL and he is nonoliguric  -Continue Bingham, continue supportive care    2-electrolyte/acid base disorders  -Electrolyte parameters satisfactory, hyperkalemia, possibly in setting of mild micro obstruction, resolved; potassium today 4.2 mmol/L  -Other parameters stable    3-BP/volume status  -Patient is normotensive with blood pressures 100s over 70s  -He has 1+ pitting lower extremity edema, continues on 40 mg of IV Lasix twice daily which can be switched to p.o. soon, also on Jardiance 10 mg daily  -He received a dose of metolazone this a.m., will try to avoid given the borderline hyponatremia  -He can continue on metoprolol succinate 12.5 mg daily    4-CKD-MBD  -Calcium 8.3 mg/dL with phosphorus 3.4 mg/dL   -stable, monitor    5-Anemia of CKD  -Hemoglobin 9.7 g/dL, stable  -Mild iron deficiency noted, has received oral iron and Venofer x 3 doses of 200 mg, will not dose any further    6-Nutrition  -Albumin last 3.4 g/dL, stable, monitor        Bhupinder Kendall MD  4:30 PM  12/20/2024    
today 3.4 mmol/L  -CO2 30, in stable range though slightly higher today  -Other parameters stable  -Repeat labs as outpatient in 1 to 2 weeks    3-BP/volume status  -Patient is normotensive, pressures 100s over 60s where it remains stable    -Continues on Jardiance 10 mg daily, also Bumex 1 mg p.o. daily  -Would hold metolazone and other further diuretics  -He can continue on metoprolol succinate 12.5 mg daily    4-CKD-MBD  -Calcium 8.7 mg/dL with phosphorus 3.2 mg/dL   -stable, monitor    5-Anemia of CKD  -Hemoglobin 9.9 g/dL, stable  -Mild iron deficiency noted, has received oral iron and Venofer x 3 doses of 200 mg, will not dose any further    6-Nutrition  -Albumin last 3.1 g/dL, stable, monitor    Stable for discharge    Bhupinder Kendall MD  11:25 AM  12/24/2024

## 2024-12-24 NOTE — PLAN OF CARE
Problem: ABCDS Injury Assessment  Goal: Absence of physical injury  12/16/2024 2215 by Geraldine Rojas RN  Outcome: Progressing  12/16/2024 1123 by Aviva Holland RN  Outcome: Progressing     Problem: Discharge Planning  Goal: Discharge to home or other facility with appropriate resources  12/16/2024 2215 by Geraldine Rojas RN  Outcome: Progressing  12/16/2024 1123 by Aviva Holland RN  Outcome: Progressing  Flowsheets (Taken 12/16/2024 0845)  Discharge to home or other facility with appropriate resources:   Identify barriers to discharge with patient and caregiver   Identify discharge learning needs (meds, wound care, etc)   Arrange for needed discharge resources and transportation as appropriate   Refer to discharge planning if patient needs post-hospital services based on physician order or complex needs related to functional status, cognitive ability or social support system     Problem: Safety - Adult  Goal: Free from fall injury  Outcome: Progressing     Problem: Chronic Conditions and Co-morbidities  Goal: Patient's chronic conditions and co-morbidity symptoms are monitored and maintained or improved  Outcome: Progressing     Problem: Pain  Goal: Verbalizes/displays adequate comfort level or baseline comfort level  Outcome: Progressing     Problem: Skin/Tissue Integrity  Goal: Absence of new skin breakdown  Description: 1.  Monitor for areas of redness and/or skin breakdown  2.  Assess vascular access sites hourly  3.  Every 4-6 hours minimum:  Change oxygen saturation probe site  4.  Every 4-6 hours:  If on nasal continuous positive airway pressure, respiratory therapy assess nares and determine need for appliance change or resting period.  Outcome: Progressing     
  Problem: ABCDS Injury Assessment  Goal: Absence of physical injury  12/19/2024 1232 by Shaila Smart RN  Outcome: Progressing     Problem: Discharge Planning  Goal: Discharge to home or other facility with appropriate resources  12/19/2024 1232 by Shaila Smart RN  Outcome: Progressing     Problem: Safety - Adult  Goal: Free from fall injury  12/19/2024 1232 by Shaila Smart RN  Outcome: Progressing     Problem: Chronic Conditions and Co-morbidities  Goal: Patient's chronic conditions and co-morbidity symptoms are monitored and maintained or improved  Outcome: Progressing     Problem: Pain  Goal: Verbalizes/displays adequate comfort level or baseline comfort level  Outcome: Progressing     Problem: Skin/Tissue Integrity  Goal: Absence of new skin breakdown  Description: 1.  Monitor for areas of redness and/or skin breakdown  2.  Assess vascular access sites hourly  3.  Every 4-6 hours minimum:  Change oxygen saturation probe site  4.  Every 4-6 hours:  If on nasal continuous positive airway pressure, respiratory therapy assess nares and determine need for appliance change or resting period.  Outcome: Progressing     Problem: Nutrition Deficit:  Goal: Optimize nutritional status  Outcome: Progressing     Problem: Neurosensory - Adult  Goal: Achieves stable or improved neurological status  Outcome: Progressing     Problem: Neurosensory - Adult  Goal: Achieves maximal functionality and self care  Outcome: Progressing     Problem: Respiratory - Adult  Goal: Achieves optimal ventilation and oxygenation  Outcome: Progressing     Problem: Cardiovascular - Adult  Goal: Maintains optimal cardiac output and hemodynamic stability  Outcome: Progressing     Problem: Skin/Tissue Integrity - Adult  Goal: Skin integrity remains intact  Outcome: Progressing     Problem: Skin/Tissue Integrity - Adult  Goal: Incisions, wounds, or drain sites healing without S/S of infection  Outcome: Progressing     Problem: Musculoskeletal 
  Problem: ABCDS Injury Assessment  Goal: Absence of physical injury  12/20/2024 0136 by Nicole Truong RN  Outcome: Progressing     Problem: Discharge Planning  Goal: Discharge to home or other facility with appropriate resources  12/20/2024 0136 by Nicole Truong RN  Outcome: Progressing     Problem: Safety - Adult  Goal: Free from fall injury  12/20/2024 0136 by Nicole Truong RN  Outcome: Progressing     Problem: Chronic Conditions and Co-morbidities  Goal: Patient's chronic conditions and co-morbidity symptoms are monitored and maintained or improved  12/20/2024 0136 by Nicole Truong RN  Outcome: Progressing     Problem: Pain  Goal: Verbalizes/displays adequate comfort level or baseline comfort level  12/20/2024 0136 by Nicole Truong RN  Outcome: Progressing     Problem: Skin/Tissue Integrity  Goal: Absence of new skin breakdown  Description: 1.  Monitor for areas of redness and/or skin breakdown  2.  Assess vascular access sites hourly  3.  Every 4-6 hours minimum:  Change oxygen saturation probe site  4.  Every 4-6 hours:  If on nasal continuous positive airway pressure, respiratory therapy assess nares and determine need for appliance change or resting period.  12/20/2024 0136 by Nicole Truong RN  Outcome: Progressing     Problem: Nutrition Deficit:  Goal: Optimize nutritional status  12/20/2024 0136 by Nicole Truong RN  Outcome: Progressing     Problem: Neurosensory - Adult  Goal: Achieves stable or improved neurological status  12/20/2024 0136 by Nicole Truong RN  Outcome: Progressing     
  Problem: ABCDS Injury Assessment  Goal: Absence of physical injury  12/20/2024 0828 by Mckenzie Marino RN  Outcome: Progressing     Problem: Discharge Planning  Goal: Discharge to home or other facility with appropriate resources  12/20/2024 0828 by Mckenzie Marino RN  Outcome: Progressing     Problem: Safety - Adult  Goal: Free from fall injury  12/20/2024 0828 by Mckenzie Marino RN  Outcome: Progressing     Problem: Chronic Conditions and Co-morbidities  Goal: Patient's chronic conditions and co-morbidity symptoms are monitored and maintained or improved  12/20/2024 0828 by Mckenzie Marino RN  Outcome: Progressing     Problem: Chronic Conditions and Co-morbidities  Goal: Patient's chronic conditions and co-morbidity symptoms are monitored and maintained or improved  12/20/2024 0828 by Mckenzie Marino RN  Outcome: Progressing     Problem: Pain  Goal: Verbalizes/displays adequate comfort level or baseline comfort level  12/20/2024 0828 by Mckenzie Marino RN  Outcome: Progressing     Problem: Skin/Tissue Integrity  Goal: Absence of new skin breakdown  Description: 1.  Monitor for areas of redness and/or skin breakdown  2.  Assess vascular access sites hourly  3.  Every 4-6 hours minimum:  Change oxygen saturation probe site  4.  Every 4-6 hours:  If on nasal continuous positive airway pressure, respiratory therapy assess nares and determine need for appliance change or resting period.  12/20/2024 0828 by Mckenzie Marino RN  Outcome: Progressing     Problem: Nutrition Deficit:  Goal: Optimize nutritional status  12/20/2024 0828 by Mckenzie Marino RN  Outcome: Progressing     Problem: Neurosensory - Adult  Goal: Achieves stable or improved neurological status  12/20/2024 0828 by Mckenzie Marino RN  Outcome: Progressing     Problem: Neurosensory - Adult  Goal: Achieves maximal functionality and self care  12/20/2024 0828 by Mckenzie Marino RN  Outcome: Progressing     Problem: Respiratory - Adult  Goal: Achieves 
  Problem: ABCDS Injury Assessment  Goal: Absence of physical injury  12/20/2024 2209 by Adelina Carty RN  Outcome: Progressing       Problem: Discharge Planning  Goal: Discharge to home or other facility with appropriate resources  12/20/2024 2209 by Adelina Carty RN  Outcome: Progressing       Problem: Safety - Adult  Goal: Free from fall injury  12/20/2024 2209 by Adelina Carty RN  Outcome: Progressing    Problem: Chronic Conditions and Co-morbidities  Goal: Patient's chronic conditions and co-morbidity symptoms are monitored and maintained or improved  12/20/2024 2209 by Adelina Carty RN  Outcome: Progressing    Problem: Pain  Goal: Verbalizes/displays adequate comfort level or baseline comfort level  12/20/2024 2209 by Adelina Carty RN  Outcome: Progressing       Problem: Skin/Tissue Integrity  Goal: Absence of new skin breakdown  Description: 1.  Monitor for areas of redness and/or skin breakdown  2.  Assess vascular access sites hourly  3.  Every 4-6 hours minimum:  Change oxygen saturation probe site  4.  Every 4-6 hours:  If on nasal continuous positive airway pressure, respiratory therapy assess nares and determine need for appliance change or resting period.  12/20/2024 2209 by Adelina Carty RN  Outcome: Progressing    Problem: Nutrition Deficit:  Goal: Optimize nutritional status  12/20/2024 2209 by Adelina Carty RN  Outcome: Progressing       Problem: Neurosensory - Adult  Goal: Achieves stable or improved neurological status  12/20/2024 2209 by Adelina Carty RN  Outcome: Progressing    Goal: Achieves maximal functionality and self care  12/20/2024 2209 by Adelina Carty RN  Outcome: Progressing       Problem: Respiratory - Adult  Goal: Achieves optimal ventilation and oxygenation  12/20/2024 2209 by Adelina Carty RN  Outcome: Progressing    Problem: Cardiovascular - Adult  Goal: Maintains optimal cardiac output and hemodynamic stability  12/20/2024 2209 by Adelina Carty RN  Outcome: 
  Problem: ABCDS Injury Assessment  Goal: Absence of physical injury  Outcome: Progressing     Problem: Discharge Planning  Goal: Discharge to home or other facility with appropriate resources  Outcome: Progressing     Problem: Safety - Adult  Goal: Free from fall injury  Outcome: Progressing     Problem: Chronic Conditions and Co-morbidities  Goal: Patient's chronic conditions and co-morbidity symptoms are monitored and maintained or improved  Outcome: Progressing     Problem: Pain  Goal: Verbalizes/displays adequate comfort level or baseline comfort level  Outcome: Progressing     Problem: Skin/Tissue Integrity  Goal: Absence of new skin breakdown  Description: 1.  Monitor for areas of redness and/or skin breakdown  2.  Assess vascular access sites hourly  3.  Every 4-6 hours minimum:  Change oxygen saturation probe site  4.  Every 4-6 hours:  If on nasal continuous positive airway pressure, respiratory therapy assess nares and determine need for appliance change or resting period.  Outcome: Progressing     Problem: Nutrition Deficit:  Goal: Optimize nutritional status  Outcome: Progressing     Problem: Neurosensory - Adult  Goal: Achieves stable or improved neurological status  Outcome: Progressing     Problem: Neurosensory - Adult  Goal: Achieves maximal functionality and self care  Outcome: Progressing     
  Problem: ABCDS Injury Assessment  Goal: Absence of physical injury  Outcome: Progressing     Problem: Discharge Planning  Goal: Discharge to home or other facility with appropriate resources  Outcome: Progressing     Problem: Safety - Adult  Goal: Free from fall injury  Outcome: Progressing     Problem: Chronic Conditions and Co-morbidities  Goal: Patient's chronic conditions and co-morbidity symptoms are monitored and maintained or improved  Outcome: Progressing     Problem: Pain  Goal: Verbalizes/displays adequate comfort level or baseline comfort level  Outcome: Progressing     Problem: Skin/Tissue Integrity  Goal: Absence of new skin breakdown  Description: 1.  Monitor for areas of redness and/or skin breakdown  2.  Assess vascular access sites hourly  3.  Every 4-6 hours minimum:  Change oxygen saturation probe site  4.  Every 4-6 hours:  If on nasal continuous positive airway pressure, respiratory therapy assess nares and determine need for appliance change or resting period.  Outcome: Progressing     Problem: Nutrition Deficit:  Goal: Optimize nutritional status  Outcome: Progressing     Problem: Neurosensory - Adult  Goal: Achieves stable or improved neurological status  Outcome: Progressing  Goal: Achieves maximal functionality and self care  Outcome: Progressing     Problem: Respiratory - Adult  Goal: Achieves optimal ventilation and oxygenation  Outcome: Progressing     Problem: Cardiovascular - Adult  Goal: Maintains optimal cardiac output and hemodynamic stability  Outcome: Progressing     Problem: Skin/Tissue Integrity - Adult  Goal: Skin integrity remains intact  Outcome: Progressing  Goal: Incisions, wounds, or drain sites healing without S/S of infection  Outcome: Progressing     Problem: Musculoskeletal - Adult  Goal: Return mobility to safest level of function  Outcome: Progressing  Goal: Return ADL status to a safe level of function  Outcome: Progressing     Problem: Genitourinary - 
  Problem: ABCDS Injury Assessment  Goal: Absence of physical injury  Outcome: Progressing     Problem: Discharge Planning  Goal: Discharge to home or other facility with appropriate resources  Outcome: Progressing  Flowsheets (Taken 12/16/2024 3709)  Discharge to home or other facility with appropriate resources:   Identify barriers to discharge with patient and caregiver   Identify discharge learning needs (meds, wound care, etc)   Arrange for needed discharge resources and transportation as appropriate   Refer to discharge planning if patient needs post-hospital services based on physician order or complex needs related to functional status, cognitive ability or social support system     
  Problem: Discharge Planning  Goal: Discharge to home or other facility with appropriate resources  12/19/2024 0109 by Abdelrahman Darling, RN  Outcome: Progressing  12/18/2024 1117 by Shaila Smart RN  Outcome: Progressing     Problem: ABCDS Injury Assessment  Goal: Absence of physical injury  12/19/2024 0109 by Abdelrahman Darling, RN  Outcome: Progressing  12/18/2024 1117 by Shaila Smart, RN  Outcome: Progressing     
Patient's chart updated to reflect:      .    - HF care plan, HF education points and HF discharge instructions.  -Orders: 2 gram sodium diet, daily weights, I/O.  -PCP and cardiology follow up appointments to be scheduled within 7 days of hospital discharge.  -CHF education session will be provided to the patient prior to hospital discharge.    Gala Quinones RN   Heart Failure Navigator   
1117 by Berry, Shaila, RN  Outcome: Progressing     Problem: Skin/Tissue Integrity - Adult  Goal: Skin integrity remains intact  12/18/2024 1117 by Shaila Smart RN  Outcome: Progressing     Problem: Skin/Tissue Integrity - Adult  Goal: Incisions, wounds, or drain sites healing without S/S of infection  12/18/2024 1117 by Shaila Smart RN  Outcome: Progressing     Problem: Musculoskeletal - Adult  Goal: Return mobility to safest level of function  12/18/2024 1117 by Shaila Smart RN  Outcome: Progressing     Problem: Musculoskeletal - Adult  Goal: Return ADL status to a safe level of function  12/18/2024 1117 by Shaila Smart RN  Outcome: Progressing     Problem: Genitourinary - Adult  Goal: Absence of urinary retention  12/18/2024 1117 by Shaila Smart RN  Outcome: Progressing     Problem: Genitourinary - Adult  Goal: Urinary catheter remains patent  12/18/2024 1117 by Shaila Smart RN  Outcome: Progressing     Problem: Infection - Adult  Goal: Absence of infection at discharge  12/18/2024 1117 by Shaila Smart RN  Outcome: Progressing     Problem: Metabolic/Fluid and Electrolytes - Adult  Goal: Electrolytes maintained within normal limits  12/18/2024 1117 by Shaila Smart RN  Outcome: Progressing     Problem: Metabolic/Fluid and Electrolytes - Adult  Goal: Hemodynamic stability and optimal renal function maintained  12/18/2024 1117 by Shaila Smart RN  Outcome: Progressing     
Adult  Goal: Absence of urinary retention  Outcome: Progressing  Goal: Urinary catheter remains patent  Outcome: Progressing     Problem: Infection - Adult  Goal: Absence of infection at discharge  Outcome: Progressing     Problem: Metabolic/Fluid and Electrolytes - Adult  Goal: Electrolytes maintained within normal limits  Outcome: Progressing  Goal: Hemodynamic stability and optimal renal function maintained  Outcome: Progressing     
RN  Outcome: Progressing     Problem: Metabolic/Fluid and Electrolytes - Adult  Goal: Electrolytes maintained within normal limits  12/21/2024 1158 by Fer Dominique RN  Outcome: Progressing  12/20/2024 2209 by Adelina Carty RN  Outcome: Progressing  Goal: Hemodynamic stability and optimal renal function maintained  12/21/2024 1158 by Fer Dominique RN  Outcome: Progressing  12/20/2024 2209 by Adelina Carty RN  Outcome: Progressing

## 2024-12-24 NOTE — DISCHARGE SUMMARY
melatonin 3 MG Tabs tablet     mirtazapine 15 MG tablet  Commonly known as: REMERON     potassium chloride 20 MEQ extended release tablet  Commonly known as: KLOR-CON M     pravastatin 40 MG tablet  Commonly known as: PRAVACHOL     Preparation H 0.25-14-74.9 % rectal ointment  Generic drug: phenylephrine-mineral oil-petrolatum     senna 8.6 MG tablet  Commonly known as: SENOKOT     tamsulosin 0.4 MG capsule  Commonly known as: FLOMAX     vitamin D 25 MCG (1000 UT) Caps            STOP taking these medications      Eliquis 2.5 MG Tabs tablet  Generic drug: apixaban               Where to Get Your Medications        These medications were sent to [facility] - [place], [place] - [address] -  [phone] - F [phone]  [facility] [address]      Phone: [phone]   amiodarone 200 MG tablet  bumetanide 1 MG tablet  metoprolol succinate 25 MG extended release tablet  midodrine 5 MG tablet           Note that more than 30 minutes was spent in preparing discharge papers, discussing discharge with patient, medication review, etc.    Signed:  Electronically signed by Theo Che MD on 12/24/2024 at 8:56 AM

## 2024-12-24 NOTE — CARE COORDINATION
Social Work/Discharge Planning:  Discharge order noted.  [facility] Duong arranged for facility to transport patient at 11:00am. Notified patient daughter Radha (ph: [phone]) of discharge time.  Notified RN of discharge time.  Electronically signed by FLORIAN Cortez on 12/24/2024 at 10:09 AM

## 2024-12-27 ENCOUNTER — TELEPHONE (OUTPATIENT)
Dept: CARDIOLOGY CLINIC | Age: 89
End: 2024-12-27

## 2024-12-27 DIAGNOSIS — I50.9 ACUTE ON CHRONIC HEART FAILURE, UNSPECIFIED HEART FAILURE TYPE (HCC): Primary | ICD-10-CM

## 2024-12-27 DIAGNOSIS — I25.5 ISCHEMIC CARDIOMYOPATHY: ICD-10-CM

## 2024-12-27 NOTE — TELEPHONE ENCOUNTER
----- Message from Dr. Tay Ocasio MD sent at 12/22/2024  3:21 PM EST -----  Can we get him in for an appointment with EP.  He followed with EP at [facility].  Having trouble getting an appointment.  Needs a generator change.  Can be any physician.    Thank you.